# Patient Record
Sex: MALE | Race: WHITE | HISPANIC OR LATINO | Employment: FULL TIME | URBAN - METROPOLITAN AREA
[De-identification: names, ages, dates, MRNs, and addresses within clinical notes are randomized per-mention and may not be internally consistent; named-entity substitution may affect disease eponyms.]

---

## 2018-11-21 ENCOUNTER — HOSPITAL ENCOUNTER (OUTPATIENT)
Dept: RADIOLOGY | Facility: MEDICAL CENTER | Age: 43
End: 2018-11-21
Attending: FAMILY MEDICINE
Payer: COMMERCIAL

## 2018-11-21 ENCOUNTER — OFFICE VISIT (OUTPATIENT)
Dept: URGENT CARE | Facility: PHYSICIAN GROUP | Age: 43
End: 2018-11-21
Payer: COMMERCIAL

## 2018-11-21 VITALS
TEMPERATURE: 98.3 F | WEIGHT: 156 LBS | OXYGEN SATURATION: 99 % | HEART RATE: 79 BPM | SYSTOLIC BLOOD PRESSURE: 130 MMHG | RESPIRATION RATE: 18 BRPM | DIASTOLIC BLOOD PRESSURE: 88 MMHG

## 2018-11-21 DIAGNOSIS — M47.816 SPONDYLOSIS OF LUMBAR SPINE: ICD-10-CM

## 2018-11-21 DIAGNOSIS — R20.2 LEFT LEG PARESTHESIAS: ICD-10-CM

## 2018-11-21 DIAGNOSIS — M51.37 DEGENERATIVE DISC DISEASE AT L5-S1 LEVEL: ICD-10-CM

## 2018-11-21 PROCEDURE — 99203 OFFICE O/P NEW LOW 30 MIN: CPT | Performed by: FAMILY MEDICINE

## 2018-11-21 PROCEDURE — 72110 X-RAY EXAM L-2 SPINE 4/>VWS: CPT

## 2018-11-21 RX ORDER — PREDNISONE 10 MG/1
TABLET ORAL
Qty: 30 TAB | Refills: 0 | Status: SHIPPED | OUTPATIENT
Start: 2018-11-21 | End: 2018-12-03

## 2018-11-21 ASSESSMENT — ENCOUNTER SYMPTOMS
MYALGIAS: 0
COUGH: 0
FATIGUE: 0
NAUSEA: 0
ABDOMINAL PAIN: 0
SENSORY CHANGE: 1
VERTIGO: 0
BACK PAIN: 0
SWOLLEN GLANDS: 0
NUMBNESS: 1
FEVER: 0
JOINT SWELLING: 0
ANOREXIA: 0
EXTREMITY NUMBNESS: 1
CHILLS: 0
HEADACHES: 0
ARTHRALGIAS: 0
VISUAL CHANGE: 0
SORE THROAT: 0
FALLS: 0
FOCAL WEAKNESS: 0
NECK PAIN: 0
CHANGE IN BOWEL HABIT: 0
DIAPHORESIS: 0
VOMITING: 0
SEIZURES: 0
WEAKNESS: 0
LOSS OF CONSCIOUSNESS: 0

## 2018-11-21 NOTE — PATIENT INSTRUCTIONS
Oral steroid as directed  Avoid strenuous activities  Work restriction for at least a week  Please establish care next door so he can get to see somebody hopefully within the next 1-2 weeks  We will refer you to Orthopedics  If you have any worsening numbness but especially new onset of muscle weakness please be seen as soon as possible      For more information see the handout below    Degenerative Disk Disease  Introduction  Degenerative disk disease is a condition caused by the changes that occur in spinal disks as you grow older. Spinal disks are soft and compressible disks located between the bones of your spine (vertebrae). These disks act like shock absorbers. Degenerative disk disease can affect the whole spine. However, the neck and lower back are most commonly affected. Many changes can occur in the spinal disks with aging, such as:  · The spinal disks may dry and shrink.  · Small tears may occur in the tough, outer covering of the disk (annulus).  · The disk space may become smaller due to loss of water.  · Abnormal growths in the bone (spurs) may occur. This can put pressure on the nerve roots exiting the spinal canal, causing pain.  · The spinal canal may become narrowed.  What increases the risk?  · Being overweight.  · Having a family history of degenerative disk disease.  · Smoking.  · There is increased risk if you are doing heavy lifting or have a sudden injury.  What are the signs or symptoms?  Symptoms vary from person to person and may include:  · Pain that varies in intensity. Some people have no pain, while others have severe pain. The location of the pain depends on the part of your backbone that is affected.  ¨ You will have neck or arm pain if a disk in the neck area is affected.  ¨ You will have pain in your back, buttocks, or legs if a disk in the lower back is affected.  · Pain that becomes worse while bending, reaching up, or with twisting movements.  · Pain that may start gradually and  then get worse as time passes. It may also start after a major or minor injury.  · Numbness or tingling in the arms or legs.  How is this diagnosed?  Your health care provider will ask you about your symptoms and about activities or habits that may cause the pain. He or she may also ask about any injuries, diseases, or treatments you have had. Your health care provider will examine you to check for the range of movement that is possible in the affected area, to check for strength in your extremities, and to check for sensation in the areas of the arms and legs supplied by different nerve roots. You may also have:  · An X-ray of the spine.  · Other imaging tests, such as MRI.  How is this treated?  Your health care provider will advise you on the best plan for treatment. Treatment may include:  · Medicines.  · Rehabilitation exercises.  Follow these instructions at home:  · Follow proper lifting and walking techniques as advised by your health care provider.  · Maintain good posture.  · Exercise regularly as advised by your health care provider.  · Perform relaxation exercises.  · Change your sitting, standing, and sleeping habits as advised by your health care provider.  · Change positions frequently.  · Lose weight or maintain a healthy weight as advised by your health care provider.  · Do not use any tobacco products, including cigarettes, chewing tobacco, or electronic cigarettes. If you need help quitting, ask your health care provider.  · Wear supportive footwear.  · Take medicines only as directed by your health care provider.  Contact a health care provider if:  · Your pain does not go away within 1-4 weeks.  · You have significant appetite or weight loss.  Get help right away if:  · Your pain is severe.  · You notice weakness in your arms, hands, or legs.  · You begin to lose control of your bladder or bowel movements.  · You have fevers or night sweats.  This information is not intended to replace advice  given to you by your health care provider. Make sure you discuss any questions you have with your health care provider.  Document Released: 10/14/2008 Document Revised: 05/25/2017 Document Reviewed: 04/21/2015  © 2017 [employer]

## 2018-11-21 NOTE — PROGRESS NOTES
Subjective:      Niranjan Richter is a 43 y.o. male who presents with Foot Problem (x3 days, left heel, stepping on flat ground, can walk around ok, feels like it is moving up leg )            Numbness   Episode onset: past 3 days. The problem has been unchanged. Associated symptoms include numbness. Pertinent negatives include no abdominal pain, anorexia, arthralgias, change in bowel habit, chest pain, chills, congestion, coughing, diaphoresis, fatigue, fever, headaches, joint swelling, myalgias, nausea, neck pain, rash, sore throat, swollen glands, urinary symptoms, vertigo, visual change, vomiting or weakness. Nothing aggravates the symptoms. He has tried nothing for the symptoms.       Review of Systems   Constitutional: Negative for chills, diaphoresis, fatigue and fever.   HENT: Negative for congestion and sore throat.    Respiratory: Negative for cough.    Cardiovascular: Negative for chest pain.   Gastrointestinal: Negative for abdominal pain, anorexia, change in bowel habit, nausea and vomiting.   Musculoskeletal: Positive for joint pain (has had some pain in the right LE but not on the left). Negative for arthralgias, back pain, falls, joint swelling, myalgias and neck pain.   Skin: Negative for rash.   Neurological: Positive for sensory change and numbness. Negative for vertigo, focal weakness, seizures, loss of consciousness, weakness and headaches.   Patient denies any history of family history of neurologic disease like multiple sclerosis.  Denies any previous back pain or problems.  Does not smoke.  He uses marijuana but no other drugs.  No alcohol use reported.       Objective:     /88   Pulse 79   Temp 36.8 °C (98.3 °F)   Resp 18   Wt 70.8 kg (156 lb)   SpO2 99%      Physical Exam   Constitutional: He is oriented to person, place, and time. He appears well-developed and well-nourished. No distress.   Cardiovascular: Normal rate.    Musculoskeletal:        Thoracic back: He exhibits normal  range of motion, no tenderness, no bony tenderness, no swelling, no edema, no deformity, no laceration, no pain, no spasm and normal pulse.        Lumbar back: Normal. He exhibits normal range of motion, no tenderness, no bony tenderness, no swelling, no edema, no deformity, no laceration, no pain, no spasm and normal pulse.   Neurological: He is alert and oriented to person, place, and time. He has normal strength. He displays normal reflexes. A sensory deficit (Paresthesia noted on the lateral aspect of the left lower extremity below the knee and also on the lateral aspect of the left foot in the S1 region) is present. He exhibits normal muscle tone.   Grossly benight otherwise   Skin: He is not diaphoretic.     accu is 101     xray shows DDD in the L5-S1 region, no other acute changes and spondylosis of the lumbar spine, most prominent in the L5-S1 region      ASSESSMENT:PLAN:  1. Spondylosis of lumbar spine  - REFERRAL TO ORTHOPEDICS    2. Degenerative disc disease at L5-S1 level  - REFERRAL TO ORTHOPEDICS    3. Left leg paresthesias  - POCT Glucose  - DX-LUMBAR SPINE-4+ VIEWS; Future  - predniSONE (DELTASONE) 10 MG Tab; Start 60mg daily x 5 days  Dispense: 30 Tab; Refill: 0  - REFERRAL TO ORTHOPEDICS    No other neurologic deficit  Recommend trial of steroid burst, and work restrictions  Advised to establish care next door and hopefully be seen within the latest couple weeks for general care  In regards to significant spondylosis in the lower lumbar spine and his paresthesia, would recommend also a formal consultation and referral to Ortho-Spine  Plan per orders and instructions  Warning signs reviewed  Advised to be seen if any worsening or new Sxs otherwise with Ortho and should establish PCP  Work/School Excuse given

## 2018-11-21 NOTE — LETTER
November 21, 2018         Patient: Niranjan Richter   YOB: 1975   Date of Visit: 11/21/2018           To Whom it May Concern:    Niranjan Richter was seen in my clinic on 11/21/2018.    Recommend work restrictions, no lifting more than 10 pounds, and avoid frequent bending until 11/30/2018    If you have any questions or concerns, please don't hesitate to call.        Sincerely,           Cierra Garcia M.D.  Electronically Signed

## 2018-11-28 ENCOUNTER — OFFICE VISIT (OUTPATIENT)
Dept: URGENT CARE | Facility: PHYSICIAN GROUP | Age: 43
End: 2018-11-28
Payer: COMMERCIAL

## 2018-11-28 VITALS
HEART RATE: 74 BPM | SYSTOLIC BLOOD PRESSURE: 126 MMHG | RESPIRATION RATE: 16 BRPM | TEMPERATURE: 98.6 F | WEIGHT: 163 LBS | DIASTOLIC BLOOD PRESSURE: 74 MMHG | BODY MASS INDEX: 22.82 KG/M2 | HEIGHT: 71 IN | OXYGEN SATURATION: 98 %

## 2018-11-28 DIAGNOSIS — M54.32 SCIATICA OF LEFT SIDE: ICD-10-CM

## 2018-11-28 PROCEDURE — 99213 OFFICE O/P EST LOW 20 MIN: CPT | Performed by: EMERGENCY MEDICINE

## 2018-11-28 ASSESSMENT — ENCOUNTER SYMPTOMS
ABDOMINAL PAIN: 0
VOMITING: 0
EYE REDNESS: 0
CHILLS: 0
NAUSEA: 0
BACK PAIN: 1
NERVOUS/ANXIOUS: 0
EYE DISCHARGE: 0
SENSORY CHANGE: 1
FEVER: 0

## 2018-11-28 NOTE — LETTER
November 28, 2018        [place] [address]        Dear Niranjan:    Please ask to be given light duty no grater than 203, no bending for the next week.    If you have any questions or concerns, please don't hesitate to call.        Sincerely,        Pastor Martinez M.D.    Electronically Signed

## 2018-11-28 NOTE — PROGRESS NOTES
"Subjective:      Niranjan Richter is a 43 y.o. male who presents with Foot Problem (numbness, continuing)            HPI  Patient is a 43-year-old male complaining of persistent left leg numbness needs a new work note.  He did not realize he had already been referred to orthopedic surgery and has not made contact with the office as of yet.    Patient denies any bowel or bladder symptoms    PMH:  has no past medical history on file.  MEDS:   Current Outpatient Prescriptions:   •  predniSONE (DELTASONE) 10 MG Tab, Start 60mg daily x 5 days, Disp: 30 Tab, Rfl: 0  ALLERGIES: No Known Allergies  SURGHX: No past surgical history on file.  SOCHX:  reports that he has never smoked. He has never used smokeless tobacco. He reports that he drinks about 1.8 oz of alcohol per week . He reports that he uses drugs, including Marijuana, about 2 times per week.  FH: family history is not on file.  Review of Systems   Constitutional: Negative for chills and fever.   Eyes: Negative for discharge and redness.   Cardiovascular: Negative for chest pain.   Gastrointestinal: Negative for abdominal pain, nausea and vomiting.   Genitourinary: Negative.    Musculoskeletal: Positive for back pain.        Patient has radiation to his left foot from his low back.   Skin: Negative for rash.   Neurological: Positive for sensory change.        Patient has no bowel or bladder incontinence.   Psychiatric/Behavioral: The patient is not nervous/anxious.           Objective:     /74   Pulse 74   Temp 37 °C (98.6 °F)   Resp 16   Ht 1.803 m (5' 11\")   Wt 73.9 kg (163 lb)   SpO2 98%   BMI 22.73 kg/m²      Physical Exam   Constitutional: He appears well-developed and well-nourished. No distress.   HENT:   Head: Normocephalic and atraumatic.   Right Ear: External ear normal.   Left Ear: External ear normal.   Eyes: Right eye exhibits no discharge.   Cardiovascular: Normal rate.    Pulmonary/Chest: Effort normal and breath sounds normal. "   Musculoskeletal: Normal range of motion.   Straight leg raise is 90 degrees bilaterally, reflexes are 1+ in the knees 1+ in the ankles.  Dorsiflexion is 5+/5.  He has no current radiculopathy on the left radiating into his left foot.    Patient has no rash on his lumbar region.   Neurological: He is alert.   Skin: Skin is warm. No rash noted. He is not diaphoretic. No erythema.   Psychiatric: He has a normal mood and affect. His behavior is normal.   Nursing note and vitals reviewed.              Assessment/Plan:     Diagnosis sciatica on the left.    Patient has not been contacted by orthopedic surgery has yet but does have a physician in the chart that would will be contacting him.  Patient will contact [phone] and check on where his appointment is in the process of contacting him with the orthopedic surgeon.    Patient will return for any bowel or bladder incontinence on emergency basis.  He has been given a work note for another week off until he can find himself up with orthopedic surgery.

## 2018-11-30 ENCOUNTER — TELEPHONE (OUTPATIENT)
Dept: SCHEDULING | Facility: IMAGING CENTER | Age: 43
End: 2018-11-30

## 2018-12-03 ENCOUNTER — OFFICE VISIT (OUTPATIENT)
Dept: INTERNAL MEDICINE | Facility: MEDICAL CENTER | Age: 43
End: 2018-12-03
Payer: COMMERCIAL

## 2018-12-03 VITALS
OXYGEN SATURATION: 97 % | HEART RATE: 69 BPM | WEIGHT: 160 LBS | SYSTOLIC BLOOD PRESSURE: 134 MMHG | BODY MASS INDEX: 22.9 KG/M2 | HEIGHT: 70 IN | TEMPERATURE: 98.5 F | DIASTOLIC BLOOD PRESSURE: 90 MMHG

## 2018-12-03 DIAGNOSIS — R20.0 NUMBNESS AND TINGLING OF LEFT LEG: ICD-10-CM

## 2018-12-03 DIAGNOSIS — R20.2 NUMBNESS AND TINGLING OF LEFT LEG: ICD-10-CM

## 2018-12-03 DIAGNOSIS — Z76.89 ENCOUNTER TO ESTABLISH CARE: ICD-10-CM

## 2018-12-03 DIAGNOSIS — Z82.3 FAMILY HISTORY OF STROKE: ICD-10-CM

## 2018-12-03 PROCEDURE — 99204 OFFICE O/P NEW MOD 45 MIN: CPT | Mod: GC | Performed by: INTERNAL MEDICINE

## 2018-12-03 ASSESSMENT — PATIENT HEALTH QUESTIONNAIRE - PHQ9: CLINICAL INTERPRETATION OF PHQ2 SCORE: 0

## 2018-12-03 NOTE — PROGRESS NOTES
New Patient to Establish    Reason to establish: New patient to establish    CC: Numbness on the left leg    HPI: Niranjan Richter is a 43 y.o. male with no significnat past medical condition presented with numbness on the left leg that started on the Thanksgiving time and continue to today's date, patient denies any leg pain, back pain, motor weakness, bowel or urinary incontinences. Denies any pressure might place on the leg from crossing legs. 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 No acute fracture. Patient otherwise feel fine, denies any heavy alcohol intake, history of trauma, diabetes, syphilis or weight changes.    Review of Systems:     Constitutional: Denies fevers, Denies weight changes  Eyes: Denies changes in vision, no eye pain  Ears/Nose/Throat/Mouth: Denies nasal congestion or sore throat   Cardiovascular: Denies chest pain or palpitations   Respiratory: Denies shortness of breath , Denies cough  Gastrointestinal/Hepatic: Denies abdominal pain, nausea, vomiting, diarrhea or constipation.  Genitourinary: Denies bladder dysfunction, dysuria or frequency  Musculoskeletal/Rheum: Previous history of right foot trauma with operative correction.  Skin: Denies rash.  Neurological: Denies headache, confusion, memory loss or focal weakness, denies saddle anaesthesia, bowel or urinary incontinence    Psychiatric: denies mood disorder       Patient Active Problem List    Diagnosis Date Noted   • Numbness and tingling of left leg 12/03/2018   • Family history of stroke 12/03/2018       History reviewed. No pertinent past medical history.    No current outpatient prescriptions on file.     No current facility-administered medications for this visit.        Allergies as  "of 12/03/2018   • (No Known Allergies)       Social History     Social History   • Marital status: Single     Spouse name: N/A   • Number of children: N/A   • Years of education: N/A     Occupational History   • Not on file.     Social History Main Topics   • Smoking status: Never Smoker   • Smokeless tobacco: Never Used   • Alcohol use 1.8 oz/week     2 Cans of beer, 1 Shots of liquor per week      Comment: Social    • Drug use: Yes     Frequency: 2.0 times per week     Types: Marijuana      Comment: occasionally   • Sexual activity: Not on file     Other Topics Concern   • Not on file     Social History Narrative   • No narrative on file       Family History   Problem Relation Age of Onset   • Stroke Mother    • No Known Problems Father        Past Surgical History:   Procedure Laterality Date   • FOOT SURGERY           /90 (BP Location: Right arm, Patient Position: Sitting, BP Cuff Size: Adult)   Pulse 69   Temp 36.9 °C (98.5 °F) (Temporal)   Ht 1.778 m (5' 10\")   Wt 72.6 kg (160 lb)   SpO2 97%   BMI 22.96 kg/m²     Physical Exam  General:  Alert and oriented, No apparent distress.  Eyes: Pupils equal and reactive. No scleral icterus.  Throat: Clear no erythema or exudates noted.  Neck: Supple. No lymphadenopathy noted. Thyroid not enlarged.  Lungs: Clear to auscultation bilaterally. No wheezes, rhonchi or crackles.  Cardiovascular: Regular rate and rhythm. No murmurs, rubs or gallops.  Abdomen:  Soft, non tender, non distended. Bowel sounds positive.  Extremities: scar on the right dorsal aspect of the foot, loss on hair symmetrical on both legs, diminish sensation to light touch and sharps on the left lower extremity along the S1 dermatome mainly  No clubbing, cyanosis, edema. Equivocal planter refluxes   Skin: Clear. No rash or suspicious skin lesions noted.    Assessment and Plan  1. Numbness and tingling of left leg  - Sensory only the S1 distribution, no pain or motor symptoms, no saddle " anaesthesia, urine or bowel incontinence.  - No significant changes on lumbar x-ray.   - Adviced the patient ok to go to work for now from our aspects, he will confirm this with referral the PT.  - Already has an appointment with the orthopedics for evaluation/ will leave the option of MRI to them if indicated   - Ordered a nerve conduction study   - CBC WITH DIFFERENTIAL; Future  - COMP METABOLIC PANEL; Future  - VITAMIN B12; Future  - REFERRAL TO NEURODIAGNOSTICS (EEG,EP,EMG/NCS/DBS) Modality Requested: NCS-Comment Extremities  - REFERRAL TO PHYSICAL THERAPY Reason for Therapy: Eval/Treat/Report    2. Encounter to Establish Care  - CBC WITH DIFFERENTIAL; Future  - COMP METABOLIC PANEL; Future    3. Family History of Stroke  - mother in the past/ not sure of age  - CBC WITH DIFFERENTIAL; Future  - Lipid Profile; Future      4. Preventive care  - Flu - refused  - Pneumococcal - not indicated    .      Signed by: Kiley Foreman M.D.

## 2018-12-07 ENCOUNTER — OFFICE VISIT (OUTPATIENT)
Dept: URGENT CARE | Facility: PHYSICIAN GROUP | Age: 43
End: 2018-12-07
Payer: COMMERCIAL

## 2018-12-07 ENCOUNTER — HOSPITAL ENCOUNTER (OUTPATIENT)
Dept: LAB | Facility: MEDICAL CENTER | Age: 43
End: 2018-12-07
Attending: INTERNAL MEDICINE
Payer: COMMERCIAL

## 2018-12-07 VITALS
TEMPERATURE: 98 F | OXYGEN SATURATION: 99 % | WEIGHT: 160 LBS | SYSTOLIC BLOOD PRESSURE: 128 MMHG | DIASTOLIC BLOOD PRESSURE: 80 MMHG | BODY MASS INDEX: 22.9 KG/M2 | RESPIRATION RATE: 16 BRPM | HEIGHT: 70 IN | HEART RATE: 68 BPM

## 2018-12-07 DIAGNOSIS — R20.0 NUMBNESS AND TINGLING OF LEFT LEG: ICD-10-CM

## 2018-12-07 DIAGNOSIS — R20.2 NUMBNESS AND TINGLING OF LEFT LEG: ICD-10-CM

## 2018-12-07 DIAGNOSIS — Z76.89 ENCOUNTER TO ESTABLISH CARE: ICD-10-CM

## 2018-12-07 DIAGNOSIS — Z82.3 FAMILY HISTORY OF STROKE: ICD-10-CM

## 2018-12-07 DIAGNOSIS — Z86.69 HX OF SCIATICA: ICD-10-CM

## 2018-12-07 LAB
ALBUMIN SERPL BCP-MCNC: 4.3 G/DL (ref 3.2–4.9)
ALBUMIN/GLOB SERPL: 1.5 G/DL
ALP SERPL-CCNC: 66 U/L (ref 30–99)
ALT SERPL-CCNC: 19 U/L (ref 2–50)
ANION GAP SERPL CALC-SCNC: 7 MMOL/L (ref 0–11.9)
AST SERPL-CCNC: 18 U/L (ref 12–45)
BASOPHILS # BLD AUTO: 0.4 % (ref 0–1.8)
BASOPHILS # BLD: 0.03 K/UL (ref 0–0.12)
BILIRUB SERPL-MCNC: 1 MG/DL (ref 0.1–1.5)
BUN SERPL-MCNC: 15 MG/DL (ref 8–22)
CALCIUM SERPL-MCNC: 10.1 MG/DL (ref 8.5–10.5)
CHLORIDE SERPL-SCNC: 104 MMOL/L (ref 96–112)
CHOLEST SERPL-MCNC: 171 MG/DL (ref 100–199)
CO2 SERPL-SCNC: 28 MMOL/L (ref 20–33)
CREAT SERPL-MCNC: 0.87 MG/DL (ref 0.5–1.4)
EOSINOPHIL # BLD AUTO: 0.07 K/UL (ref 0–0.51)
EOSINOPHIL NFR BLD: 1 % (ref 0–6.9)
ERYTHROCYTE [DISTWIDTH] IN BLOOD BY AUTOMATED COUNT: 46.4 FL (ref 35.9–50)
FASTING STATUS PATIENT QL REPORTED: NORMAL
GLOBULIN SER CALC-MCNC: 2.8 G/DL (ref 1.9–3.5)
GLUCOSE SERPL-MCNC: 77 MG/DL (ref 65–99)
HCT VFR BLD AUTO: 43.4 % (ref 42–52)
HDLC SERPL-MCNC: 87 MG/DL
HGB BLD-MCNC: 15 G/DL (ref 14–18)
IMM GRANULOCYTES # BLD AUTO: 0.01 K/UL (ref 0–0.11)
IMM GRANULOCYTES NFR BLD AUTO: 0.1 % (ref 0–0.9)
LDLC SERPL CALC-MCNC: 70 MG/DL
LYMPHOCYTES # BLD AUTO: 2.34 K/UL (ref 1–4.8)
LYMPHOCYTES NFR BLD: 34.6 % (ref 22–41)
MCH RBC QN AUTO: 34.8 PG (ref 27–33)
MCHC RBC AUTO-ENTMCNC: 34.6 G/DL (ref 33.7–35.3)
MCV RBC AUTO: 100.7 FL (ref 81.4–97.8)
MONOCYTES # BLD AUTO: 0.34 K/UL (ref 0–0.85)
MONOCYTES NFR BLD AUTO: 5 % (ref 0–13.4)
NEUTROPHILS # BLD AUTO: 3.98 K/UL (ref 1.82–7.42)
NEUTROPHILS NFR BLD: 58.9 % (ref 44–72)
NRBC # BLD AUTO: 0 K/UL
NRBC BLD-RTO: 0 /100 WBC
PLATELET # BLD AUTO: 249 K/UL (ref 164–446)
PMV BLD AUTO: 9.3 FL (ref 9–12.9)
POTASSIUM SERPL-SCNC: 4.1 MMOL/L (ref 3.6–5.5)
PROT SERPL-MCNC: 7.1 G/DL (ref 6–8.2)
RBC # BLD AUTO: 4.31 M/UL (ref 4.7–6.1)
SODIUM SERPL-SCNC: 139 MMOL/L (ref 135–145)
TRIGL SERPL-MCNC: 70 MG/DL (ref 0–149)
VIT B12 SERPL-MCNC: 317 PG/ML (ref 211–911)
WBC # BLD AUTO: 6.8 K/UL (ref 4.8–10.8)

## 2018-12-07 PROCEDURE — 82607 VITAMIN B-12: CPT

## 2018-12-07 PROCEDURE — 99212 OFFICE O/P EST SF 10 MIN: CPT | Performed by: EMERGENCY MEDICINE

## 2018-12-07 PROCEDURE — 80061 LIPID PANEL: CPT

## 2018-12-07 PROCEDURE — 80053 COMPREHEN METABOLIC PANEL: CPT

## 2018-12-07 PROCEDURE — 85025 COMPLETE CBC W/AUTO DIFF WBC: CPT

## 2018-12-07 PROCEDURE — 36415 COLL VENOUS BLD VENIPUNCTURE: CPT

## 2018-12-07 NOTE — LETTER
December 7, 2018        [place] [address]        Dear Niranjan:    This letter is to verify that you may return to work full duty as of now.    If you have any questions or concerns, please don't hesitate to call.        Sincerely,        Pastor Martinez M.D.    Electronically Signed

## 2018-12-08 ASSESSMENT — ENCOUNTER SYMPTOMS
BACK PAIN: 0
EYE REDNESS: 0
EYE DISCHARGE: 0
NECK PAIN: 0
FEVER: 0
VOMITING: 0
CHILLS: 0
NAUSEA: 0
NERVOUS/ANXIOUS: 0

## 2018-12-09 NOTE — PROGRESS NOTES
"Subjective:      Niranjan Richter is a 43 y.o. male who presents with Letter for School/Work (needs letter returning to full duty at work)            HPI  43 yr old male with recent back pain who was seeen in UC and then by PCP and now needs a note to return to work. Pt denies any further complaints   PMH:  has no past medical history on file.  MEDS: No current outpatient prescriptions on file.  ALLERGIES: No Known Allergies  SURGHX:   Past Surgical History:   Procedure Laterality Date   • FOOT SURGERY       SOCHX:  reports that he has never smoked. He has never used smokeless tobacco. He reports that he drinks about 1.8 oz of alcohol per week . He reports that he uses drugs, including Marijuana, about 2 times per week.  FH: Reviewed with patient, not pertinent to this visit.   Review of Systems   Constitutional: Negative for chills and fever.   HENT: Negative for ear pain.    Eyes: Negative for discharge and redness.   Cardiovascular: Negative for chest pain.   Gastrointestinal: Negative for nausea and vomiting.   Genitourinary: Negative.         No bowel or bladder incontinence   Musculoskeletal: Negative for back pain, joint pain and neck pain.   Skin: Negative for rash.   Psychiatric/Behavioral: The patient is not nervous/anxious.           Objective:     /80 (BP Location: Right arm, Patient Position: Sitting, BP Cuff Size: Small adult)   Pulse 68   Temp 36.7 °C (98 °F) (Temporal)   Resp 16   Ht 1.778 m (5' 10\")   Wt 72.6 kg (160 lb)   SpO2 99%   BMI 22.96 kg/m²      Physical Exam   Constitutional: He appears well-developed and well-nourished. No distress.   HENT:   Head: Normocephalic and atraumatic.   Right Ear: External ear normal.   Left Ear: External ear normal.   Cardiovascular: Normal rate.    Pulmonary/Chest: Effort normal.   Musculoskeletal: Normal range of motion. He exhibits no edema, tenderness or deformity.   SLR 90/90, Reflexs 2+ knee and ankles, dorsiflexion great toe 5/5 , No " radiculopathy no saddle anesthesia and normal recal tone no lumbar rash.   Nursing note and vitals reviewed.              Assessment/Plan:     DX Resolved low back strain with sciatica on the left     Pt given note to return to work fullduty.

## 2018-12-12 ENCOUNTER — PHYSICAL THERAPY (OUTPATIENT)
Dept: PHYSICAL THERAPY | Facility: REHABILITATION | Age: 43
End: 2018-12-12
Attending: INTERNAL MEDICINE
Payer: COMMERCIAL

## 2018-12-12 DIAGNOSIS — R20.2 NUMBNESS AND TINGLING OF LEFT LEG: ICD-10-CM

## 2018-12-12 DIAGNOSIS — R20.0 NUMBNESS AND TINGLING OF LEFT LEG: ICD-10-CM

## 2018-12-12 PROCEDURE — 97161 PT EVAL LOW COMPLEX 20 MIN: CPT

## 2018-12-12 PROCEDURE — 97110 THERAPEUTIC EXERCISES: CPT

## 2018-12-12 ASSESSMENT — ENCOUNTER SYMPTOMS
PAIN SCALE: 0
PAIN SCALE AT LOWEST: 0
PAIN SCALE AT HIGHEST: 0

## 2018-12-12 NOTE — OP THERAPY EVALUATION
Outpatient Physical Therapy  INITIAL EVALUATION    [facility] [place]  [address], [address]  [place] [address]  Phone:  [phone]  Fax:  [phone]    Date of Evaluation: 2018    Patient: Niranjan Richter  YOB: 1975  MRN: [id number]     Referring Provider: Kiley Foreman M.D.  [address] [place] [address]   Referring Diagnosis Numbness and tingling of left leg [R20.0, R20.2]     Time Calculation  Start time: 0150  Stop time: 0240 Time Calculation (min): 50 minutes     Physical Therapy Occurrence Codes    Date of onset of impairment:  18   Date physical therapy care plan established or reviewed:  18   Date physical therapy treatment started:  18          Chief Complaint: L foot numbness    Visit Diagnoses     ICD-10-CM   1. Numbness and tingling of left leg R20.0    R20.2         Subjective:   History of Present Illness:     Mechanism of injury:  Niranjan Richter is a 43 y.o. male that presents to therapy with Left leg numbness. He states that symptoms came on with sudden onset while walking. Symptoms started along the outside of the L foot while walking. Notes that he had pins and needles up along the side of the foot along the side of the calf. This feeling has disapated and now it is just numb along that side. Patient denies fevers/chills/weakness of lower extremities, bowel/bladder incontinence, saddle anesthesia.    Aggravating factors: none.   Releiving factors: none.    ADL limitations:none    Pain:     Current pain ratin    At best pain ratin    At worst pain ratin      No past medical history on file.  Past Surgical History:   Procedure Laterality Date   • FOOT SURGERY       Social History   Substance Use Topics   • Smoking status: Never Smoker   • Smokeless tobacco: Never Used   • Alcohol use 1.8 oz/week     2 Cans of beer, 1 Shots of liquor per week      Comment: Social      Family and Occupational  History     Social History   • Marital status: Single     Spouse name: N/A   • Number of children: N/A   • Years of education: N/A       Objective     Hip Screen   Hip range of motion within functional limits.  Hip strength within functional limits  Hip joint mobility within functional limits    Neurological Testing     Sensation     Knee   Left Knee   Absent: pin prick    Right Knee   Intact: pin prick     Comments   Left pin prick: along lateral ankle and foot.     Reflexes   Left   Patellar (L4): normal (2+)  Achilles (S1): normal (2+)  Ankle clonus reflex: negative  Babinski sign: negative    Right   Patellar (L4): normal (2+)  Achilles (S1): normal (2+)  Ankle clonus reflex: negative  Babinski sign: negative    Myotome testing   Lumbar (left)   All left lumbar myotomes within normal limits    Lumbar (right)   All right lumbar myotomes within normal limits    Dermatome testing   Lumbar (left)   L1: intact  L2: intact  L3: intact  L4: intact  L5: intact  S1: decreased  S2: intact    Lumbar (right)   All right lumbar dermatomes intact    Palpation     Additional Palpation Details  No point tenderness LE or lumbar spine    Active Range of Motion     Lumbar   All lumbar active range of motion within functional limits  Left Knee   Normal active range of motion    Right Knee   Normal active range of motion    Passive Range of Motion     Lumbar   All lumbar passive range of motion within functional limits  Left Knee   Normal passive range of motion    Right Knee   Normal passive range of motion    Joint Play     Additional Joint Play Details   and UPA non painful and no change for symptoms in LLE.     Strength:      Abdominals   Lower abdominals: Able to maintain neutral with functional movement    Back   Flexion: 5  Extension: 5    Tests       Lumbar spine (left)      Negative slump.   Lumbar spine (right)     Negative slump.     Left Pelvic Girdle/Sacrum   Negative: sacral rotation, sacral thrust and thigh  thrust.     Right Pelvic Girdle/Sacrum   Negative: sacral rotation, sacral thrust and thigh thrust.     Left Hip   Negative Gaenslen's, SI compression and SI distraction.   SLR: Negative.     Right Hip   Negative Gaenslen's, SI compression and SI distraction.   SLR: Negative.     Additional Tests Details  Fibular compression negative. Palpation along fibular nerve distribution negative for change in symptoms.         Therapeutic Exercises (CPT 93068):       Therapeutic Exercise Summary: HEP instruction/performance and development.    Fibular nerve tensioners and glides x 20 2-3x a day. Pt education on fibular nerve entrapment, importance of foot checks with absence of pain response along lateral foot.       Time-based treatments/modalities:  Therapeutic exercise minutes (CPT 75297): 10 minutes       Assessment, Response and Plan:   Assessment details:  Niranjan Richter is a 43 y.o. male with signs and symptoms possibly consistent with previous fibular nerve entrapment. testing of lumbar spine was overall negative and symptoms do not fit typical presentations of L5-S1 nerve root irritation.  Skilled therapy is not indicated at this time as pt is not functionally limited and symptoms are resolving. Pt given exercises to promote proper nerve health and instructed to f/u with his doctor if symptoms do not continue to improve.      Plan:   Therapy options:  No further treatment needed      Functional Limitation G-Codes and Severity Modifiers  PT Functional Assessment Tool Used: LEFS  PT Functional Assessment Score: 74/80     Referring provider co-signature:  I have reviewed this plan of care and my co-signature certifies the need for services.  Certification Dates:   From 12/12/18     To 12/12/18     Physician Signature: ________________________________ Date: ______________

## 2018-12-31 ENCOUNTER — APPOINTMENT (OUTPATIENT)
Dept: PHYSICAL THERAPY | Facility: REHABILITATION | Age: 43
End: 2018-12-31
Attending: INTERNAL MEDICINE
Payer: COMMERCIAL

## 2019-01-02 ENCOUNTER — APPOINTMENT (OUTPATIENT)
Dept: PHYSICAL THERAPY | Facility: REHABILITATION | Age: 44
End: 2019-01-02
Attending: INTERNAL MEDICINE
Payer: COMMERCIAL

## 2019-01-07 ENCOUNTER — APPOINTMENT (OUTPATIENT)
Dept: PHYSICAL THERAPY | Facility: REHABILITATION | Age: 44
End: 2019-01-07
Attending: INTERNAL MEDICINE
Payer: COMMERCIAL

## 2019-01-09 ENCOUNTER — APPOINTMENT (OUTPATIENT)
Dept: PHYSICAL THERAPY | Facility: REHABILITATION | Age: 44
End: 2019-01-09
Attending: INTERNAL MEDICINE
Payer: COMMERCIAL

## 2019-01-14 ENCOUNTER — APPOINTMENT (OUTPATIENT)
Dept: PHYSICAL THERAPY | Facility: REHABILITATION | Age: 44
End: 2019-01-14
Attending: INTERNAL MEDICINE
Payer: COMMERCIAL

## 2019-01-16 ENCOUNTER — APPOINTMENT (OUTPATIENT)
Dept: PHYSICAL THERAPY | Facility: REHABILITATION | Age: 44
End: 2019-01-16
Attending: INTERNAL MEDICINE
Payer: COMMERCIAL

## 2019-01-21 ENCOUNTER — APPOINTMENT (OUTPATIENT)
Dept: PHYSICAL THERAPY | Facility: REHABILITATION | Age: 44
End: 2019-01-21
Attending: INTERNAL MEDICINE
Payer: COMMERCIAL

## 2019-01-23 ENCOUNTER — APPOINTMENT (OUTPATIENT)
Dept: PHYSICAL THERAPY | Facility: REHABILITATION | Age: 44
End: 2019-01-23
Attending: INTERNAL MEDICINE
Payer: COMMERCIAL

## 2019-05-31 ENCOUNTER — APPOINTMENT (OUTPATIENT)
Dept: RADIOLOGY | Facility: MEDICAL CENTER | Age: 44
End: 2019-05-31
Attending: EMERGENCY MEDICINE
Payer: COMMERCIAL

## 2019-05-31 ENCOUNTER — HOSPITAL ENCOUNTER (EMERGENCY)
Facility: MEDICAL CENTER | Age: 44
End: 2019-05-31
Attending: EMERGENCY MEDICINE
Payer: COMMERCIAL

## 2019-05-31 VITALS
SYSTOLIC BLOOD PRESSURE: 115 MMHG | BODY MASS INDEX: 22.9 KG/M2 | DIASTOLIC BLOOD PRESSURE: 83 MMHG | TEMPERATURE: 98.8 F | HEIGHT: 70 IN | RESPIRATION RATE: 16 BRPM | HEART RATE: 50 BPM | OXYGEN SATURATION: 98 % | WEIGHT: 160 LBS

## 2019-05-31 DIAGNOSIS — T50.905A ADVERSE EFFECT OF DRUG, INITIAL ENCOUNTER: ICD-10-CM

## 2019-05-31 DIAGNOSIS — F41.9 ANXIETY: ICD-10-CM

## 2019-05-31 LAB
ALBUMIN SERPL BCP-MCNC: 4.5 G/DL (ref 3.2–4.9)
ALBUMIN/GLOB SERPL: 2 G/DL
ALP SERPL-CCNC: 73 U/L (ref 30–99)
ALT SERPL-CCNC: 17 U/L (ref 2–50)
ANION GAP SERPL CALC-SCNC: 8 MMOL/L (ref 0–11.9)
AST SERPL-CCNC: 17 U/L (ref 12–45)
BASOPHILS # BLD AUTO: 0.6 % (ref 0–1.8)
BASOPHILS # BLD: 0.03 K/UL (ref 0–0.12)
BILIRUB SERPL-MCNC: 0.4 MG/DL (ref 0.1–1.5)
BUN SERPL-MCNC: 20 MG/DL (ref 8–22)
CALCIUM SERPL-MCNC: 8.9 MG/DL (ref 8.5–10.5)
CHLORIDE SERPL-SCNC: 105 MMOL/L (ref 96–112)
CO2 SERPL-SCNC: 26 MMOL/L (ref 20–33)
CREAT SERPL-MCNC: 0.71 MG/DL (ref 0.5–1.4)
EKG IMPRESSION: NORMAL
EOSINOPHIL # BLD AUTO: 0.15 K/UL (ref 0–0.51)
EOSINOPHIL NFR BLD: 3 % (ref 0–6.9)
ERYTHROCYTE [DISTWIDTH] IN BLOOD BY AUTOMATED COUNT: 49.1 FL (ref 35.9–50)
GLOBULIN SER CALC-MCNC: 2.3 G/DL (ref 1.9–3.5)
GLUCOSE SERPL-MCNC: 103 MG/DL (ref 65–99)
HCT VFR BLD AUTO: 42.9 % (ref 42–52)
HGB BLD-MCNC: 14.4 G/DL (ref 14–18)
IMM GRANULOCYTES # BLD AUTO: 0.01 K/UL (ref 0–0.11)
IMM GRANULOCYTES NFR BLD AUTO: 0.2 % (ref 0–0.9)
LIPASE SERPL-CCNC: 25 U/L (ref 11–82)
LYMPHOCYTES # BLD AUTO: 2.13 K/UL (ref 1–4.8)
LYMPHOCYTES NFR BLD: 42.6 % (ref 22–41)
MCH RBC QN AUTO: 35 PG (ref 27–33)
MCHC RBC AUTO-ENTMCNC: 33.6 G/DL (ref 33.7–35.3)
MCV RBC AUTO: 104.4 FL (ref 81.4–97.8)
MONOCYTES # BLD AUTO: 0.39 K/UL (ref 0–0.85)
MONOCYTES NFR BLD AUTO: 7.8 % (ref 0–13.4)
NEUTROPHILS # BLD AUTO: 2.29 K/UL (ref 1.82–7.42)
NEUTROPHILS NFR BLD: 45.8 % (ref 44–72)
NRBC # BLD AUTO: 0 K/UL
NRBC BLD-RTO: 0 /100 WBC
PLATELET # BLD AUTO: 256 K/UL (ref 164–446)
PMV BLD AUTO: 8.8 FL (ref 9–12.9)
POTASSIUM SERPL-SCNC: 4.2 MMOL/L (ref 3.6–5.5)
PROT SERPL-MCNC: 6.8 G/DL (ref 6–8.2)
RBC # BLD AUTO: 4.11 M/UL (ref 4.7–6.1)
SODIUM SERPL-SCNC: 139 MMOL/L (ref 135–145)
TROPONIN I SERPL-MCNC: <0.01 NG/ML (ref 0–0.04)
WBC # BLD AUTO: 5 K/UL (ref 4.8–10.8)

## 2019-05-31 PROCEDURE — 80053 COMPREHEN METABOLIC PANEL: CPT

## 2019-05-31 PROCEDURE — 84484 ASSAY OF TROPONIN QUANT: CPT

## 2019-05-31 PROCEDURE — 36415 COLL VENOUS BLD VENIPUNCTURE: CPT

## 2019-05-31 PROCEDURE — 93005 ELECTROCARDIOGRAM TRACING: CPT | Performed by: EMERGENCY MEDICINE

## 2019-05-31 PROCEDURE — 99285 EMERGENCY DEPT VISIT HI MDM: CPT

## 2019-05-31 PROCEDURE — A9270 NON-COVERED ITEM OR SERVICE: HCPCS | Performed by: EMERGENCY MEDICINE

## 2019-05-31 PROCEDURE — 700111 HCHG RX REV CODE 636 W/ 250 OVERRIDE (IP)

## 2019-05-31 PROCEDURE — 71045 X-RAY EXAM CHEST 1 VIEW: CPT

## 2019-05-31 PROCEDURE — 85025 COMPLETE CBC W/AUTO DIFF WBC: CPT

## 2019-05-31 PROCEDURE — 83690 ASSAY OF LIPASE: CPT

## 2019-05-31 PROCEDURE — 70551 MRI BRAIN STEM W/O DYE: CPT

## 2019-05-31 PROCEDURE — 700102 HCHG RX REV CODE 250 W/ 637 OVERRIDE(OP): Performed by: EMERGENCY MEDICINE

## 2019-05-31 PROCEDURE — 96374 THER/PROPH/DIAG INJ IV PUSH: CPT

## 2019-05-31 RX ORDER — LORAZEPAM 1 MG/1
1 TABLET ORAL ONCE
Status: COMPLETED | OUTPATIENT
Start: 2019-05-31 | End: 2019-05-31

## 2019-05-31 RX ORDER — LORAZEPAM 2 MG/ML
1 INJECTION INTRAMUSCULAR ONCE
Status: COMPLETED | OUTPATIENT
Start: 2019-05-31 | End: 2019-05-31

## 2019-05-31 RX ORDER — LORAZEPAM 2 MG/ML
INJECTION INTRAMUSCULAR
Status: COMPLETED
Start: 2019-05-31 | End: 2019-05-31

## 2019-05-31 RX ORDER — GABAPENTIN 100 MG/1
200 CAPSULE ORAL 3 TIMES DAILY
Status: ON HOLD | COMMUNITY
End: 2020-02-06

## 2019-05-31 RX ORDER — DIAZEPAM 5 MG/1
5 TABLET ORAL EVERY 8 HOURS PRN
Qty: 12 TAB | Refills: 0 | Status: SHIPPED | OUTPATIENT
Start: 2019-05-31 | End: 2019-06-04

## 2019-05-31 RX ORDER — MELOXICAM 15 MG/1
15 TABLET ORAL DAILY
Status: ON HOLD | COMMUNITY
End: 2020-10-22

## 2019-05-31 RX ORDER — BACLOFEN 10 MG/1
10 TABLET ORAL 3 TIMES DAILY
Qty: 60 TAB | Refills: 0 | Status: ON HOLD | OUTPATIENT
Start: 2019-05-31 | End: 2020-02-06

## 2019-05-31 RX ADMIN — LORAZEPAM 1 MG: 2 INJECTION INTRAMUSCULAR; INTRAVENOUS at 06:33

## 2019-05-31 RX ADMIN — LORAZEPAM 1 MG: 1 TABLET ORAL at 08:59

## 2019-05-31 RX ADMIN — LORAZEPAM 1 MG: 2 INJECTION INTRAMUSCULAR at 06:33

## 2019-05-31 ASSESSMENT — LIFESTYLE VARIABLES: DO YOU DRINK ALCOHOL: NO

## 2019-05-31 NOTE — ED NOTES
Med rec complete per S/O and [facility]  Allergies reviewed  No PO antibiotics in last 30 days

## 2019-05-31 NOTE — ED NOTES
Discharge instructions given.  All questions answered.  Prescriptions given x2.  Pt to follow-up with PCP and neuro, return to ER if symptoms worsen as discussed.  Pt verbalized understanding.  All belongings with pt.  Pt escorted to lobby.

## 2019-05-31 NOTE — ED NOTES
BREAK RN:  Ativan PO ordered by ERP. Dysphagia screening completed d/t pt's c/o weakness. Dysphagia screening PASS. Pt medicated per MAR.

## 2019-05-31 NOTE — ED NOTES
"Chief Complaint   Patient presents with   • Weakness     generalized weakness that started at work, got dizzy and started shaking       Pt brought immediately to R10 from triage, ERP at bedside, pt in gown and on monitor. Orders received.       /83   Pulse 62   Temp 37.1 °C (98.8 °F) (Temporal)   Resp 18   Ht 1.778 m (5' 10\")   Wt 72.6 kg (160 lb)   SpO2 100%   BMI 22.96 kg/m²   "

## 2019-05-31 NOTE — ED PROVIDER NOTES
ED Provider Note    CHIEF COMPLAINT  Chief Complaint   Patient presents with   • Weakness     generalized weakness that started at work, got dizzy and started shaking       HPI  Niranjan Richter is a 43 y.o. male who presents to emerge from today with complaints of generalized weakness, shaking to his arms increased on the right, difficulty speaking which started 1 hour ago while at work.  Patient awoke this morning normal.  He has had a history of increasing panic attacks over this past weekend.  He states that he has history of disc disease over lumbar spine with pain and numbness to his left leg.  He was seen by orthopedics and placed on gabapentin and increase the dose to 1800 mg a day and started noticing shakes and not feeling good.  Denies chest pain or shortness of breath no fever chills.  NIH scale of 1 due to stuttering speech.  Discussed case with neurologist both feel he was not candidate for TPA as his symptoms resolved with treatment of Ativan and has history of anxiety/panic attacks that this may be result of his medications recently as described above.    REVIEW OF SYSTEMS  See HPI for further details. All other systems are negative.     PAST MEDICAL HISTORY  No past medical history on file.    FAMILY HISTORY  [unfilled]    SOCIAL HISTORY  Social History     Social History   • Marital status: Single     Spouse name: N/A   • Number of children: N/A   • Years of education: N/A     Social History Main Topics   • Smoking status: Never Smoker   • Smokeless tobacco: Never Used   • Alcohol use 1.8 oz/week     2 Cans of beer, 1 Shots of liquor per week      Comment: Social    • Drug use: Yes     Frequency: 2.0 times per week     Types: Marijuana      Comment: occasionally   • Sexual activity: Not on file     Other Topics Concern   • Not on file     Social History Narrative   • No narrative on file       SURGICAL HISTORY  Past Surgical History:   Procedure Laterality Date   • FOOT SURGERY         CURRENT  "MEDICATIONS  Home Medications     Reviewed by Izzy Meier (Pharmacy Tech) on 05/31/19 at 0953  Med List Status: Complete   Medication Last Dose Status   gabapentin (NEURONTIN) 100 MG Cap 5/30/2019 Active   meloxicam (MOBIC) 15 MG tablet 5/30/2019 Active                ALLERGIES  No Known Allergies    PHYSICAL EXAM  VITAL SIGNS: /83   Pulse (!) 50   Temp 37.1 °C (98.8 °F) (Temporal)   Resp 16   Ht 1.778 m (5' 10\")   Wt 72.6 kg (160 lb)   SpO2 98%   BMI 22.96 kg/m²  Room air O2: 100    Constitutional: Well developed, Well nourished,  acute distress, Non-toxic appearance.   HENT: Normocephalic, Atraumatic, Bilateral external ears normal, Oropharynx moist, No oral exudates, Nose normal.   Eyes:  Conjunctiva normal, No discharge.   Neck: Normal range of motion, No tenderness, Supple, No stridor.   Lymphatic: No lymphadenopathy noted.   Cardiovascular: Normal heart rate, Normal rhythm, No murmurs, No rubs, No gallops.   Thorax & Lungs: Normal breath sounds, No respiratory distress, No wheezing, No chest tenderness.   Abdomen: Bowel sounds normal, Soft, No tenderness, No masses, No pulsatile masses.   Skin: Warm, Dry, No erythema, No rash.   Back: No tenderness, No CVA tenderness.   Extremities: Intact distal pulses, No edema, No tenderness, No cyanosis, No clubbing.   Musculoskeletal: Good range of motion in all major joints. No tenderness to palpation or major deformities noted.  Patient initially unable to ambulate following treatment he is able to ambulate without difficulty.  Neurologic: Alert & oriented x 3, patient has tremors/shaking to both hands right greater than left there is no focal deficits he does have stuttering speech but no lateralizing signs noted.  Patient not considered a candidate for TPA as discussed above.  Psychiatric: Affect normal, Judgment normal, Mood anxious.     EKG  Normal sinus rhythm at 80 bpm, no ST elevation or depression, no widening of the QRS complex, good R " wave progression, IL intervals are normal, no diagnostic Q waves noted.  This is a 12-lead EKG there is no previous EKG available at this time for comparison.    RADIOLOGY/PROCEDURES  MR-BRAIN-W/O   Final Result      MRI of the brain without contrast within normal limits.      DX-CHEST-PORTABLE (1 VIEW)   Final Result      No acute cardiopulmonary abnormality.            COURSE & MEDICAL DECISION MAKING  Pertinent Labs & Imaging studies reviewed. (See chart for details)  Patient had difficulty speaking mostly of stuttering speech with generalized weakness he had no focal deficits or lateralizing signs.  Discussed however the case with neurologist Dr. Napier and he recommended MRI scan be performed this was negative patient has had a history of recent placed on gabapentin and symptoms seem to get worse when he increase his dose is had increasing anxiety attacks he was given Ativan here in the emergency room with resolution of his symptoms although it did start coming back and was given a second dose.  Discussed with Dr. Cross neurologist currently on-call were all in agreement this patient does not have any lateralizing signs or evidence of CVA this is most likely medication reaction with superimposed anxiety.  Discussed case with Dr. Osborn patient's orthopod and we will discontinue gabapentin he was placed on baclofen and limited amount of Valium for anxiety muscle relaxant will follow up with Dr. Osborn this coming week in his office return to persistent worsening symptoms on reexamination prior to discharge patient is asymptomatic except for sleepy from his medication feels much improved with patient his wife verbalized above instructions and need for follow-up.    FINAL IMPRESSION  1.  Acute adverse medication reaction  2.  Generalized weakness second #1  3.  Anxiety/panic attack  4.  History of herniated disc with radiculopathy         Electronically signed by: Herman Faith, 5/31/2019 1:34 PM

## 2020-01-02 ENCOUNTER — HOSPITAL ENCOUNTER (OUTPATIENT)
Dept: RADIOLOGY | Facility: MEDICAL CENTER | Age: 45
End: 2020-01-02
Attending: NEUROLOGICAL SURGERY
Payer: COMMERCIAL

## 2020-01-02 DIAGNOSIS — M54.50 LOW BACK PAIN, UNSPECIFIED BACK PAIN LATERALITY, UNSPECIFIED CHRONICITY, UNSPECIFIED WHETHER SCIATICA PRESENT: ICD-10-CM

## 2020-01-02 PROCEDURE — 72110 X-RAY EXAM L-2 SPINE 4/>VWS: CPT

## 2020-01-29 ENCOUNTER — HOSPITAL ENCOUNTER (OUTPATIENT)
Dept: RADIOLOGY | Facility: MEDICAL CENTER | Age: 45
End: 2020-01-29
Attending: SURGERY
Payer: COMMERCIAL

## 2020-01-29 DIAGNOSIS — G54.0 TOS (THORACIC OUTLET SYNDROME): ICD-10-CM

## 2020-01-29 PROCEDURE — 71046 X-RAY EXAM CHEST 2 VIEWS: CPT

## 2020-02-06 ENCOUNTER — APPOINTMENT (OUTPATIENT)
Dept: RADIOLOGY | Facility: MEDICAL CENTER | Age: 45
DRG: 030 | End: 2020-02-06
Attending: SURGERY
Payer: COMMERCIAL

## 2020-02-06 ENCOUNTER — ANESTHESIA (OUTPATIENT)
Dept: SURGERY | Facility: MEDICAL CENTER | Age: 45
DRG: 030 | End: 2020-02-06
Payer: COMMERCIAL

## 2020-02-06 ENCOUNTER — ANESTHESIA EVENT (OUTPATIENT)
Dept: SURGERY | Facility: MEDICAL CENTER | Age: 45
DRG: 030 | End: 2020-02-06
Payer: COMMERCIAL

## 2020-02-06 ENCOUNTER — HOSPITAL ENCOUNTER (INPATIENT)
Facility: MEDICAL CENTER | Age: 45
LOS: 1 days | DRG: 030 | End: 2020-02-07
Attending: SURGERY | Admitting: SURGERY
Payer: COMMERCIAL

## 2020-02-06 PROCEDURE — 0PT10ZZ RESECTION OF 1 TO 2 RIBS, OPEN APPROACH: ICD-10-PCS | Performed by: SURGERY

## 2020-02-06 PROCEDURE — 700111 HCHG RX REV CODE 636 W/ 250 OVERRIDE (IP)

## 2020-02-06 PROCEDURE — 501838 HCHG SUTURE GENERAL: Performed by: SURGERY

## 2020-02-06 PROCEDURE — 700101 HCHG RX REV CODE 250: Performed by: STUDENT IN AN ORGANIZED HEALTH CARE EDUCATION/TRAINING PROGRAM

## 2020-02-06 PROCEDURE — A9270 NON-COVERED ITEM OR SERVICE: HCPCS | Performed by: ANESTHESIOLOGY

## 2020-02-06 PROCEDURE — 01N30ZZ RELEASE BRACHIAL PLEXUS, OPEN APPROACH: ICD-10-PCS | Performed by: SURGERY

## 2020-02-06 PROCEDURE — 501411 HCHG SPONGE, BABY LAP W/O RINGS: Performed by: SURGERY

## 2020-02-06 PROCEDURE — 770006 HCHG ROOM/CARE - MED/SURG/GYN SEMI*

## 2020-02-06 PROCEDURE — A9270 NON-COVERED ITEM OR SERVICE: HCPCS

## 2020-02-06 PROCEDURE — 700112 HCHG RX REV CODE 229: Performed by: SURGERY

## 2020-02-06 PROCEDURE — 160035 HCHG PACU - 1ST 60 MINS PHASE I: Performed by: SURGERY

## 2020-02-06 PROCEDURE — 700111 HCHG RX REV CODE 636 W/ 250 OVERRIDE (IP): Performed by: STUDENT IN AN ORGANIZED HEALTH CARE EDUCATION/TRAINING PROGRAM

## 2020-02-06 PROCEDURE — 160002 HCHG RECOVERY MINUTES (STAT): Performed by: SURGERY

## 2020-02-06 PROCEDURE — 700102 HCHG RX REV CODE 250 W/ 637 OVERRIDE(OP)

## 2020-02-06 PROCEDURE — 71045 X-RAY EXAM CHEST 1 VIEW: CPT

## 2020-02-06 PROCEDURE — 88300 SURGICAL PATH GROSS: CPT

## 2020-02-06 PROCEDURE — 501445 HCHG STAPLER, SKIN DISP: Performed by: SURGERY

## 2020-02-06 PROCEDURE — 160029 HCHG SURGERY MINUTES - 1ST 30 MINS LEVEL 4: Performed by: SURGERY

## 2020-02-06 PROCEDURE — 700105 HCHG RX REV CODE 258: Performed by: SURGERY

## 2020-02-06 PROCEDURE — 700101 HCHG RX REV CODE 250: Performed by: SURGERY

## 2020-02-06 PROCEDURE — A9270 NON-COVERED ITEM OR SERVICE: HCPCS | Performed by: SURGERY

## 2020-02-06 PROCEDURE — 160041 HCHG SURGERY MINUTES - EA ADDL 1 MIN LEVEL 4: Performed by: SURGERY

## 2020-02-06 PROCEDURE — 700102 HCHG RX REV CODE 250 W/ 637 OVERRIDE(OP): Performed by: SURGERY

## 2020-02-06 PROCEDURE — 160009 HCHG ANES TIME/MIN: Performed by: SURGERY

## 2020-02-06 PROCEDURE — 160048 HCHG OR STATISTICAL LEVEL 1-5: Performed by: SURGERY

## 2020-02-06 PROCEDURE — 700102 HCHG RX REV CODE 250 W/ 637 OVERRIDE(OP): Performed by: ANESTHESIOLOGY

## 2020-02-06 RX ORDER — SUCCINYLCHOLINE/SOD CL,ISO/PF 200MG/10ML
SYRINGE (ML) INTRAVENOUS PRN
Status: DISCONTINUED | OUTPATIENT
Start: 2020-02-06 | End: 2020-02-06 | Stop reason: SURG

## 2020-02-06 RX ORDER — HYDROMORPHONE HYDROCHLORIDE 1 MG/ML
0.2 INJECTION, SOLUTION INTRAMUSCULAR; INTRAVENOUS; SUBCUTANEOUS
Status: DISCONTINUED | OUTPATIENT
Start: 2020-02-06 | End: 2020-02-06 | Stop reason: HOSPADM

## 2020-02-06 RX ORDER — FAMOTIDINE 20 MG/1
20 TABLET, FILM COATED ORAL 2 TIMES DAILY
Status: DISCONTINUED | OUTPATIENT
Start: 2020-02-06 | End: 2020-02-07

## 2020-02-06 RX ORDER — DEXAMETHASONE SODIUM PHOSPHATE 4 MG/ML
INJECTION, SOLUTION INTRA-ARTICULAR; INTRALESIONAL; INTRAMUSCULAR; INTRAVENOUS; SOFT TISSUE PRN
Status: DISCONTINUED | OUTPATIENT
Start: 2020-02-06 | End: 2020-02-06 | Stop reason: SURG

## 2020-02-06 RX ORDER — CELECOXIB 200 MG/1
200 CAPSULE ORAL 2 TIMES DAILY
Status: DISCONTINUED | OUTPATIENT
Start: 2020-02-06 | End: 2020-02-07 | Stop reason: HOSPADM

## 2020-02-06 RX ORDER — BISACODYL 10 MG
10 SUPPOSITORY, RECTAL RECTAL
Status: DISCONTINUED | OUTPATIENT
Start: 2020-02-06 | End: 2020-02-07 | Stop reason: HOSPADM

## 2020-02-06 RX ORDER — ACETAMINOPHEN 500 MG
1000 TABLET ORAL ONCE
Status: COMPLETED | OUTPATIENT
Start: 2020-02-06 | End: 2020-02-06

## 2020-02-06 RX ORDER — AMOXICILLIN 250 MG
1 CAPSULE ORAL
Status: DISCONTINUED | OUTPATIENT
Start: 2020-02-06 | End: 2020-02-07 | Stop reason: HOSPADM

## 2020-02-06 RX ORDER — ACETAMINOPHEN 500 MG
1000 TABLET ORAL EVERY 6 HOURS
Status: DISCONTINUED | OUTPATIENT
Start: 2020-02-06 | End: 2020-02-07 | Stop reason: HOSPADM

## 2020-02-06 RX ORDER — AMOXICILLIN 250 MG
1 CAPSULE ORAL NIGHTLY
Status: DISCONTINUED | OUTPATIENT
Start: 2020-02-06 | End: 2020-02-07 | Stop reason: HOSPADM

## 2020-02-06 RX ORDER — BUPIVACAINE HYDROCHLORIDE AND EPINEPHRINE 2.5; 5 MG/ML; UG/ML
INJECTION, SOLUTION EPIDURAL; INFILTRATION; INTRACAUDAL; PERINEURAL
Status: DISCONTINUED | OUTPATIENT
Start: 2020-02-06 | End: 2020-02-06 | Stop reason: HOSPADM

## 2020-02-06 RX ORDER — LIDOCAINE HYDROCHLORIDE 10 MG/ML
INJECTION, SOLUTION EPIDURAL; INFILTRATION; INTRACAUDAL; PERINEURAL
Status: COMPLETED
Start: 2020-02-06 | End: 2020-02-06

## 2020-02-06 RX ORDER — HYDROMORPHONE HYDROCHLORIDE 2 MG/ML
INJECTION, SOLUTION INTRAMUSCULAR; INTRAVENOUS; SUBCUTANEOUS PRN
Status: DISCONTINUED | OUTPATIENT
Start: 2020-02-06 | End: 2020-02-06 | Stop reason: SURG

## 2020-02-06 RX ORDER — OXYCODONE HCL 5 MG/5 ML
10 SOLUTION, ORAL ORAL
Status: COMPLETED | OUTPATIENT
Start: 2020-02-06 | End: 2020-02-06

## 2020-02-06 RX ORDER — ROCURONIUM BROMIDE 10 MG/ML
INJECTION, SOLUTION INTRAVENOUS PRN
Status: DISCONTINUED | OUTPATIENT
Start: 2020-02-06 | End: 2020-02-06 | Stop reason: SURG

## 2020-02-06 RX ORDER — MEPERIDINE HYDROCHLORIDE 25 MG/ML
12.5 INJECTION INTRAMUSCULAR; INTRAVENOUS; SUBCUTANEOUS
Status: DISCONTINUED | OUTPATIENT
Start: 2020-02-06 | End: 2020-02-06 | Stop reason: HOSPADM

## 2020-02-06 RX ORDER — HYDROMORPHONE HYDROCHLORIDE 1 MG/ML
0.1 INJECTION, SOLUTION INTRAMUSCULAR; INTRAVENOUS; SUBCUTANEOUS
Status: DISCONTINUED | OUTPATIENT
Start: 2020-02-06 | End: 2020-02-06 | Stop reason: HOSPADM

## 2020-02-06 RX ORDER — DIPHENHYDRAMINE HYDROCHLORIDE 50 MG/ML
12.5 INJECTION INTRAMUSCULAR; INTRAVENOUS
Status: DISCONTINUED | OUTPATIENT
Start: 2020-02-06 | End: 2020-02-06 | Stop reason: HOSPADM

## 2020-02-06 RX ORDER — CELECOXIB 200 MG/1
400 CAPSULE ORAL ONCE
Status: COMPLETED | OUTPATIENT
Start: 2020-02-06 | End: 2020-02-06

## 2020-02-06 RX ORDER — HYDROMORPHONE HYDROCHLORIDE 1 MG/ML
0.4 INJECTION, SOLUTION INTRAMUSCULAR; INTRAVENOUS; SUBCUTANEOUS
Status: DISCONTINUED | OUTPATIENT
Start: 2020-02-06 | End: 2020-02-06 | Stop reason: HOSPADM

## 2020-02-06 RX ORDER — OXYCODONE HYDROCHLORIDE 5 MG/1
5 TABLET ORAL
Status: DISCONTINUED | OUTPATIENT
Start: 2020-02-06 | End: 2020-02-07

## 2020-02-06 RX ORDER — POLYETHYLENE GLYCOL 3350 17 G/17G
1 POWDER, FOR SOLUTION ORAL 2 TIMES DAILY
Status: DISCONTINUED | OUTPATIENT
Start: 2020-02-06 | End: 2020-02-07 | Stop reason: HOSPADM

## 2020-02-06 RX ORDER — HALOPERIDOL 5 MG/ML
1 INJECTION INTRAMUSCULAR
Status: DISCONTINUED | OUTPATIENT
Start: 2020-02-06 | End: 2020-02-06 | Stop reason: HOSPADM

## 2020-02-06 RX ORDER — ONDANSETRON 2 MG/ML
4 INJECTION INTRAMUSCULAR; INTRAVENOUS EVERY 4 HOURS PRN
Status: DISCONTINUED | OUTPATIENT
Start: 2020-02-06 | End: 2020-02-07 | Stop reason: HOSPADM

## 2020-02-06 RX ORDER — METHOCARBAMOL 750 MG/1
750 TABLET, FILM COATED ORAL 3 TIMES DAILY
Status: ON HOLD | COMMUNITY
End: 2020-10-22

## 2020-02-06 RX ORDER — MIDAZOLAM HYDROCHLORIDE 1 MG/ML
INJECTION INTRAMUSCULAR; INTRAVENOUS PRN
Status: DISCONTINUED | OUTPATIENT
Start: 2020-02-06 | End: 2020-02-06 | Stop reason: SURG

## 2020-02-06 RX ORDER — HYDROMORPHONE HYDROCHLORIDE 1 MG/ML
0.5 INJECTION, SOLUTION INTRAMUSCULAR; INTRAVENOUS; SUBCUTANEOUS
Status: DISCONTINUED | OUTPATIENT
Start: 2020-02-06 | End: 2020-02-07

## 2020-02-06 RX ORDER — CEFAZOLIN SODIUM 1 G/3ML
INJECTION, POWDER, FOR SOLUTION INTRAMUSCULAR; INTRAVENOUS PRN
Status: DISCONTINUED | OUTPATIENT
Start: 2020-02-06 | End: 2020-02-06 | Stop reason: SURG

## 2020-02-06 RX ORDER — DOCUSATE SODIUM 100 MG/1
100 CAPSULE, LIQUID FILLED ORAL 2 TIMES DAILY
Status: DISCONTINUED | OUTPATIENT
Start: 2020-02-06 | End: 2020-02-07 | Stop reason: HOSPADM

## 2020-02-06 RX ORDER — GABAPENTIN 300 MG/1
300 CAPSULE ORAL 3 TIMES DAILY
Status: DISCONTINUED | OUTPATIENT
Start: 2020-02-06 | End: 2020-02-07

## 2020-02-06 RX ORDER — OXYCODONE HYDROCHLORIDE 5 MG/1
10 TABLET ORAL
Status: DISCONTINUED | OUTPATIENT
Start: 2020-02-06 | End: 2020-02-07

## 2020-02-06 RX ORDER — LIDOCAINE HYDROCHLORIDE 20 MG/ML
INJECTION, SOLUTION EPIDURAL; INFILTRATION; INTRACAUDAL; PERINEURAL PRN
Status: DISCONTINUED | OUTPATIENT
Start: 2020-02-06 | End: 2020-02-06 | Stop reason: SURG

## 2020-02-06 RX ORDER — ENEMA 19; 7 G/133ML; G/133ML
1 ENEMA RECTAL
Status: DISCONTINUED | OUTPATIENT
Start: 2020-02-06 | End: 2020-02-07 | Stop reason: HOSPADM

## 2020-02-06 RX ORDER — SODIUM CHLORIDE, SODIUM LACTATE, POTASSIUM CHLORIDE, CALCIUM CHLORIDE 600; 310; 30; 20 MG/100ML; MG/100ML; MG/100ML; MG/100ML
INJECTION, SOLUTION INTRAVENOUS CONTINUOUS
Status: ACTIVE | OUTPATIENT
Start: 2020-02-06 | End: 2020-02-06

## 2020-02-06 RX ORDER — ONDANSETRON 2 MG/ML
INJECTION INTRAMUSCULAR; INTRAVENOUS PRN
Status: DISCONTINUED | OUTPATIENT
Start: 2020-02-06 | End: 2020-02-06 | Stop reason: SURG

## 2020-02-06 RX ORDER — OXYCODONE HCL 5 MG/5 ML
SOLUTION, ORAL ORAL
Status: COMPLETED
Start: 2020-02-06 | End: 2020-02-06

## 2020-02-06 RX ORDER — ONDANSETRON 2 MG/ML
4 INJECTION INTRAMUSCULAR; INTRAVENOUS
Status: DISCONTINUED | OUTPATIENT
Start: 2020-02-06 | End: 2020-02-06 | Stop reason: HOSPADM

## 2020-02-06 RX ORDER — SODIUM CHLORIDE, SODIUM LACTATE, POTASSIUM CHLORIDE, CALCIUM CHLORIDE 600; 310; 30; 20 MG/100ML; MG/100ML; MG/100ML; MG/100ML
INJECTION, SOLUTION INTRAVENOUS CONTINUOUS
Status: DISCONTINUED | OUTPATIENT
Start: 2020-02-06 | End: 2020-02-06 | Stop reason: HOSPADM

## 2020-02-06 RX ORDER — OXYCODONE HCL 5 MG/5 ML
5 SOLUTION, ORAL ORAL
Status: COMPLETED | OUTPATIENT
Start: 2020-02-06 | End: 2020-02-06

## 2020-02-06 RX ADMIN — ROCURONIUM BROMIDE 5 MG: 10 INJECTION, SOLUTION INTRAVENOUS at 13:44

## 2020-02-06 RX ADMIN — FENTANYL CITRATE 50 MCG: 50 INJECTION, SOLUTION INTRAMUSCULAR; INTRAVENOUS at 16:26

## 2020-02-06 RX ADMIN — FENTANYL CITRATE 25 MCG: 0.05 INJECTION, SOLUTION INTRAMUSCULAR; INTRAVENOUS at 16:55

## 2020-02-06 RX ADMIN — OXYCODONE HYDROCHLORIDE 5 MG: 5 SOLUTION ORAL at 17:03

## 2020-02-06 RX ADMIN — ACETAMINOPHEN 1000 MG: 500 TABLET ORAL at 11:07

## 2020-02-06 RX ADMIN — SENNOSIDES-DOCUSATE SODIUM TAB 8.6-50 MG 1 TABLET: 8.6-5 TAB at 20:45

## 2020-02-06 RX ADMIN — SODIUM CHLORIDE, POTASSIUM CHLORIDE, SODIUM LACTATE AND CALCIUM CHLORIDE: 600; 310; 30; 20 INJECTION, SOLUTION INTRAVENOUS at 11:05

## 2020-02-06 RX ADMIN — PROPOFOL 200 MG: 10 INJECTION, EMULSION INTRAVENOUS at 13:45

## 2020-02-06 RX ADMIN — LIDOCAINE HYDROCHLORIDE 0.5 ML: 10 INJECTION, SOLUTION EPIDURAL; INFILTRATION; INTRACAUDAL; PERINEURAL at 10:45

## 2020-02-06 RX ADMIN — ACETAMINOPHEN 1000 MG: 500 TABLET ORAL at 18:31

## 2020-02-06 RX ADMIN — HYDROMORPHONE HYDROCHLORIDE 0.5 MG: 2 INJECTION, SOLUTION INTRAMUSCULAR; INTRAVENOUS; SUBCUTANEOUS at 13:54

## 2020-02-06 RX ADMIN — Medication 5 MG: at 17:03

## 2020-02-06 RX ADMIN — FENTANYL CITRATE 25 MCG: 50 INJECTION, SOLUTION INTRAMUSCULAR; INTRAVENOUS at 17:00

## 2020-02-06 RX ADMIN — LIDOCAINE HYDROCHLORIDE 100 MG: 20 INJECTION, SOLUTION EPIDURAL; INFILTRATION; INTRACAUDAL at 13:45

## 2020-02-06 RX ADMIN — MIDAZOLAM HYDROCHLORIDE 2 MG: 1 INJECTION, SOLUTION INTRAMUSCULAR; INTRAVENOUS at 13:37

## 2020-02-06 RX ADMIN — ACETAMINOPHEN 1000 MG: 500 TABLET ORAL at 23:00

## 2020-02-06 RX ADMIN — Medication 80 MG: at 13:45

## 2020-02-06 RX ADMIN — FENTANYL CITRATE 25 MCG: 50 INJECTION, SOLUTION INTRAMUSCULAR; INTRAVENOUS at 16:55

## 2020-02-06 RX ADMIN — CELECOXIB 400 MG: 200 CAPSULE ORAL at 11:07

## 2020-02-06 RX ADMIN — DOCUSATE SODIUM 100 MG: 100 CAPSULE, LIQUID FILLED ORAL at 18:31

## 2020-02-06 RX ADMIN — FAMOTIDINE 20 MG: 20 TABLET ORAL at 18:31

## 2020-02-06 RX ADMIN — ONDANSETRON 4 MG: 2 INJECTION INTRAMUSCULAR; INTRAVENOUS at 16:27

## 2020-02-06 RX ADMIN — DEXAMETHASONE SODIUM PHOSPHATE 4 MG: 4 INJECTION, SOLUTION INTRA-ARTICULAR; INTRALESIONAL; INTRAMUSCULAR; INTRAVENOUS; SOFT TISSUE at 13:48

## 2020-02-06 RX ADMIN — CEFAZOLIN 2 G: 330 INJECTION, POWDER, FOR SOLUTION INTRAMUSCULAR; INTRAVENOUS at 14:01

## 2020-02-06 RX ADMIN — CELECOXIB 200 MG: 200 CAPSULE ORAL at 18:31

## 2020-02-06 RX ADMIN — FENTANYL CITRATE 50 MCG: 50 INJECTION, SOLUTION INTRAMUSCULAR; INTRAVENOUS at 16:40

## 2020-02-06 RX ADMIN — HYDROMORPHONE HYDROCHLORIDE 0.5 MG: 2 INJECTION, SOLUTION INTRAMUSCULAR; INTRAVENOUS; SUBCUTANEOUS at 14:05

## 2020-02-06 RX ADMIN — Medication 0.5 ML: at 10:45

## 2020-02-06 RX ADMIN — FENTANYL CITRATE 50 MCG: 50 INJECTION, SOLUTION INTRAMUSCULAR; INTRAVENOUS at 17:08

## 2020-02-06 RX ADMIN — HYDROMORPHONE HYDROCHLORIDE 1 MG: 2 INJECTION, SOLUTION INTRAMUSCULAR; INTRAVENOUS; SUBCUTANEOUS at 13:38

## 2020-02-06 SDOH — HEALTH STABILITY: MENTAL HEALTH: HOW OFTEN DO YOU HAVE 6 OR MORE DRINKS ON ONE OCCASION?: NEVER

## 2020-02-06 SDOH — HEALTH STABILITY: MENTAL HEALTH: HOW OFTEN DO YOU HAVE A DRINK CONTAINING ALCOHOL?: 4 OR MORE TIMES A WEEK

## 2020-02-06 SDOH — HEALTH STABILITY: MENTAL HEALTH: HOW MANY STANDARD DRINKS CONTAINING ALCOHOL DO YOU HAVE ON A TYPICAL DAY?: 1 OR 2

## 2020-02-06 SDOH — ECONOMIC STABILITY: FOOD INSECURITY: WITHIN THE PAST 12 MONTHS, THE FOOD YOU BOUGHT JUST DIDN'T LAST AND YOU DIDN'T HAVE MONEY TO GET MORE.: NEVER TRUE

## 2020-02-06 SDOH — ECONOMIC STABILITY: TRANSPORTATION INSECURITY
IN THE PAST 12 MONTHS, HAS LACK OF TRANSPORTATION KEPT YOU FROM MEETINGS, WORK, OR FROM GETTING THINGS NEEDED FOR DAILY LIVING?: NO

## 2020-02-06 SDOH — ECONOMIC STABILITY: FOOD INSECURITY: WITHIN THE PAST 12 MONTHS, YOU WORRIED THAT YOUR FOOD WOULD RUN OUT BEFORE YOU GOT MONEY TO BUY MORE.: NEVER TRUE

## 2020-02-06 SDOH — ECONOMIC STABILITY: TRANSPORTATION INSECURITY
IN THE PAST 12 MONTHS, HAS THE LACK OF TRANSPORTATION KEPT YOU FROM MEDICAL APPOINTMENTS OR FROM GETTING MEDICATIONS?: NO

## 2020-02-06 ASSESSMENT — COGNITIVE AND FUNCTIONAL STATUS - GENERAL
SUGGESTED CMS G CODE MODIFIER DAILY ACTIVITY: CH
SUGGESTED CMS G CODE MODIFIER DAILY ACTIVITY: CH
DAILY ACTIVITIY SCORE: 24
DAILY ACTIVITIY SCORE: 24
MOBILITY SCORE: 24
MOBILITY SCORE: 24
SUGGESTED CMS G CODE MODIFIER MOBILITY: CH
SUGGESTED CMS G CODE MODIFIER MOBILITY: CH

## 2020-02-06 ASSESSMENT — LIFESTYLE VARIABLES
HOW MANY TIMES IN THE PAST YEAR HAVE YOU HAD 5 OR MORE DRINKS IN A DAY: 1
TOTAL SCORE: 2
ALCOHOL_USE: YES
TOTAL SCORE: 2
EVER FELT BAD OR GUILTY ABOUT YOUR DRINKING: YES
HAVE PEOPLE ANNOYED YOU BY CRITICIZING YOUR DRINKING: NO
HAVE YOU EVER FELT YOU SHOULD CUT DOWN ON YOUR DRINKING: YES
EVER_SMOKED: NEVER
EVER HAD A DRINK FIRST THING IN THE MORNING TO STEADY YOUR NERVES TO GET RID OF A HANGOVER: NO
ON A TYPICAL DAY WHEN YOU DRINK ALCOHOL HOW MANY DRINKS DO YOU HAVE: 3
CONSUMPTION TOTAL: POSITIVE
AVERAGE NUMBER OF DAYS PER WEEK YOU HAVE A DRINK CONTAINING ALCOHOL: 4
TOTAL SCORE: 2
DOES PATIENT WANT TO STOP DRINKING: NO

## 2020-02-06 ASSESSMENT — COPD QUESTIONNAIRES
HAVE YOU SMOKED AT LEAST 100 CIGARETTES IN YOUR ENTIRE LIFE: NO/DON'T KNOW
COPD SCREENING SCORE: 0
DO YOU EVER COUGH UP ANY MUCUS OR PHLEGM?: NO/ONLY WITH OCCASIONAL COLDS OR INFECTIONS
DURING THE PAST 4 WEEKS HOW MUCH DID YOU FEEL SHORT OF BREATH: NONE/LITTLE OF THE TIME
IN THE PAST 12 MONTHS DO YOU DO LESS THAN YOU USED TO BECAUSE OF YOUR BREATHING PROBLEMS: DISAGREE/UNSURE

## 2020-02-06 ASSESSMENT — PATIENT HEALTH QUESTIONNAIRE - PHQ9
2. FEELING DOWN, DEPRESSED, IRRITABLE, OR HOPELESS: NOT AT ALL
1. LITTLE INTEREST OR PLEASURE IN DOING THINGS: NOT AT ALL
SUM OF ALL RESPONSES TO PHQ9 QUESTIONS 1 AND 2: 0

## 2020-02-06 NOTE — LETTER
[facility]  GEN SURGERY TAE 4TH  [address]  [place] [address]  [phone]     February 7, 2020    Patient: Niranjan Richter   YOB: 1975   Date of Visit: 2/4/2020       To Whom It May Concern:    Niranjan Richter was seen and treated at [facility]. He was admitted 02/06/2020 and discharged home 02/07/2020. Please excuse him from work until cleared by his outpatient provider. Follow up is scheduled on 02/18/2020.    Sincerely,     DEBBI Morris.

## 2020-02-06 NOTE — ANESTHESIA PROCEDURE NOTES
Airway  Date/Time: 2/6/2020 1:47 PM  Performed by: Omar Hooks M.D.  Authorized by: Omar Hooks M.D.     Location:  OR  Urgency:  Elective  Indications for Airway Management:  Anesthesia  Spontaneous Ventilation: absent    Sedation Level:  Deep  Preoxygenated: Yes    Patient Position:  Sniffing  Final Airway Type:  Endotracheal airway  Final Endotracheal Airway:  ETT  Cuffed: Yes    Technique Used for Successful ETT Placement:  Direct laryngoscopy  Insertion Site:  Oral  Blade Type:  Arianne  Laryngoscope Blade/Videolaryngoscope Blade Size:  4  ETT Size (mm):  7.5  Measured from:  Teeth  ETT to Teeth (cm):  25  Placement Verified by: auscultation and capnometry    Cormack-Lehane Classification:  Grade I - full view of glottis  Number of Attempts at Approach:  1

## 2020-02-06 NOTE — ANESTHESIA PREPROCEDURE EVALUATION
Relevant Problems   No relevant active problems       Physical Exam    Airway   Mallampati: I  TM distance: >3 FB  Neck ROM: full       Cardiovascular - normal exam  Rhythm: regular  Rate: normal  (-) murmur     Dental - normal exam         Pulmonary - normal exam  Breath sounds clear to auscultation     Abdominal    Neurological - normal exam                 Anesthesia Plan    ASA 1       Plan - general       Airway plan will be ETT        Induction: intravenous    Postoperative Plan: Postoperative administration of opioids is intended.    Pertinent diagnostic labs and testing reviewed    Informed Consent:    Anesthetic plan and risks discussed with patient.    Use of blood products discussed with: patient whom consented to blood products.

## 2020-02-07 ENCOUNTER — APPOINTMENT (OUTPATIENT)
Dept: RADIOLOGY | Facility: MEDICAL CENTER | Age: 45
DRG: 030 | End: 2020-02-07
Attending: SURGERY
Payer: COMMERCIAL

## 2020-02-07 VITALS
RESPIRATION RATE: 17 BRPM | HEART RATE: 68 BPM | HEIGHT: 71 IN | BODY MASS INDEX: 21.7 KG/M2 | DIASTOLIC BLOOD PRESSURE: 89 MMHG | TEMPERATURE: 99 F | OXYGEN SATURATION: 98 % | SYSTOLIC BLOOD PRESSURE: 125 MMHG | WEIGHT: 154.98 LBS

## 2020-02-07 PROBLEM — G54.0 THORACIC OUTLET SYNDROME: Status: ACTIVE | Noted: 2020-02-07

## 2020-02-07 LAB — PATHOLOGY CONSULT NOTE: NORMAL

## 2020-02-07 PROCEDURE — 71045 X-RAY EXAM CHEST 1 VIEW: CPT

## 2020-02-07 PROCEDURE — 700102 HCHG RX REV CODE 250 W/ 637 OVERRIDE(OP): Performed by: SURGERY

## 2020-02-07 PROCEDURE — A9270 NON-COVERED ITEM OR SERVICE: HCPCS | Performed by: SURGERY

## 2020-02-07 PROCEDURE — 700112 HCHG RX REV CODE 229: Performed by: SURGERY

## 2020-02-07 RX ORDER — ACETAMINOPHEN 325 MG/1
325 TABLET ORAL EVERY 6 HOURS
COMMUNITY
Start: 2020-02-07 | End: 2020-02-11

## 2020-02-07 RX ORDER — IBUPROFEN 800 MG/1
800 TABLET ORAL 3 TIMES DAILY
COMMUNITY
Start: 2020-02-07 | End: 2020-02-11

## 2020-02-07 RX ADMIN — FAMOTIDINE 20 MG: 20 TABLET ORAL at 04:57

## 2020-02-07 RX ADMIN — DOCUSATE SODIUM 100 MG: 100 CAPSULE, LIQUID FILLED ORAL at 04:57

## 2020-02-07 RX ADMIN — ACETAMINOPHEN 1000 MG: 500 TABLET ORAL at 04:57

## 2020-02-07 RX ADMIN — CELECOXIB 200 MG: 200 CAPSULE ORAL at 04:57

## 2020-02-07 ASSESSMENT — ENCOUNTER SYMPTOMS
SPEECH CHANGE: 0
SHORTNESS OF BREATH: 0
SENSORY CHANGE: 1
TINGLING: 1
FOCAL WEAKNESS: 0
MYALGIAS: 1
FEVER: 0
CHILLS: 0

## 2020-02-07 NOTE — PROGRESS NOTES
Pt discharged to home with wife. IV d/c'd. Regular diet. Activity as tolerated. Pt and family understand verbal and written discharge instructions. Walked to  parking with wife, ambulates steadily. Pt agrees to return to hospital for worsening symptoms.     Axillary dressing changed prior to d/c. Extra tegaderms given to patient. Showed pt how to change axillary dressing. Work note also given with discharge instruction.

## 2020-02-07 NOTE — ANESTHESIA TIME REPORT
Anesthesia Start and Stop Event Times     Date Time Event    2/6/2020 1337 Ready for Procedure     1337 Anesthesia Start     1643 Anesthesia Stop        Responsible Staff  02/06/20    Name Role Begin End    Omar Hooks M.D. Anesth 1337 1643        Preop Diagnosis (Free Text):  Pre-op Diagnosis     THORACIC OUTLET SYNDROME        Preop Diagnosis (Codes):    Post op Diagnosis  Thoracic outlet syndrome      Premium Reason  A. 3PM - 7AM    Comments:

## 2020-02-07 NOTE — PROGRESS NOTES
Pt AO x 4  Vitals signs stable  Pt denies chest pain/SOB, N/V  Pt endorses 4/10 pain, pt prefers to use tylenol instead of opiates due to past dependence. Pt states pain is managed with tylenol.   O2 sat >90% on RA breathing unlabored  Surgical incision in R axillary, transparent dressing in place - CDI.   Last BM was on 2/6 prior to surgery.   Pt ambulates self with initial standby assist.     Admission assessment done. 2 RN skin check done. Pt oriented to unit.     Updated plan of care discussed with pt. Safety education done. Falls precautions in place.   Pt safety maintained. Hourly rounding done.

## 2020-02-07 NOTE — PROGRESS NOTES
Trauma / Surgical Daily Progress Note    Date of Service  2/7/2020    Chief Complaint  44 y.o. male admitted 2/6/2020 with THORACIC OUTLET SYNDROME    Interval Events  POD # 1 Transaxillary first rib resection, neurolysis of brachial plexus  Adequate pain control without narcotics or gabapentin  Declining gabapentin due to previous adverse reaction  RUE intermittent numbness/tingling, 5/5 strength  Incision clean, small drainage under Tegaderm  Discharge instructions reviewed    - Change dressing  - Disposition home      Review of Systems  Review of Systems   Constitutional: Negative for chills and fever.   Respiratory: Negative for shortness of breath.    Cardiovascular: Negative for chest pain.   Musculoskeletal: Positive for myalgias (right axilla at incision site).   Neurological: Positive for tingling (intermittent to right hand/fingers) and sensory change (intermittent numbness to right forearm, hand/fingers). Negative for speech change and focal weakness.        Vital Signs  Temp:  [36.4 °C (97.6 °F)-38 °C (100.4 °F)] 37.2 °C (99 °F)  Pulse:  [55-86] 68  Resp:  [9-21] 17  BP: (118-162)/() 125/89  SpO2:  [96 %-100 %] 98 %    Physical Exam  Physical Exam  Vitals signs and nursing note reviewed.   Constitutional:       General: He is awake. He is not in acute distress.     Appearance: He is well-developed. He is not ill-appearing.   Cardiovascular:      Pulses: Normal pulses.   Pulmonary:      Effort: Pulmonary effort is normal. No respiratory distress.   Musculoskeletal:         General: Tenderness (incision site) present.      Comments: Right axilla incision clean, small bloody drainage beneath Tegaderm dressing   Skin:     General: Skin is warm and dry.      Capillary Refill: Capillary refill takes less than 2 seconds.   Neurological:      General: No focal deficit present.      Mental Status: He is alert and oriented to person, place, and time.   Psychiatric:         Mood and Affect: Mood normal.          Behavior: Behavior normal. Behavior is cooperative.         Laboratory  Recent Results (from the past 24 hour(s))   Histology Request    Collection Time: 02/07/20  7:24 AM   Result Value Ref Range    Pathology Request Sent to Histo        Fluids    Intake/Output Summary (Last 24 hours) at 2/7/2020 0837  Last data filed at 2/7/2020 0410  Gross per 24 hour   Intake 2120 ml   Output 1160 ml   Net 960 ml       Core Measures & Quality Metrics  Labs reviewed, Medications reviewed and Radiology images reviewed  Archuleta catheter: No Archuleta      DVT Prophylaxis: Contraindicated - High bleeding risk  DVT prophylaxis - mechanical: SCDs  Ulcer prophylaxis: Not indicated    Assessed for rehab: Patient returned to prior level of function, rehabilitation not indicated at this time    YSABEL Score  ETOH Screening    Assessment/Plan  Neurogenic thoracic outlet syndrome- (present on admission)  Assessment & Plan  2/6 Transaxillary first rib resection, neurolysis of brachial plexus.  Blaze Mallory MD. Surgery.      Discussed patient condition with RN, , , Patient and general surgery. Dr. Mallory

## 2020-02-07 NOTE — PROGRESS NOTES
2 RN skin check complete    Surgical incision R axillary.   Scabs on BLE - skin intact.   18g L wrist.     No other skin issues. Pt turns self.

## 2020-02-07 NOTE — OR SURGEON
Immediate Post OP Note    PreOp Diagnosis: Thoracic Outlet Syndrome  PostOp Diagnosis: Same    Procedure(s):  EXCISION, RIB - FIRST RIGHT - Wound Class: Clean    Surgeon(s):  Blaze Mallory M.D.    Anesthesiologist/Type of Anesthesia:  Anesthesiologist: Omar Hooks M.D./General    Surgical Staff:  Circulator: Yarely Johnson R.N.  Scrub Person: Concepcion Solis Assist: DAVID Macias    Specimens removed if any:  ID Type Source Tests Collected by Time Destination   A : Right first rib Other Other PATHOLOGY SPECIMEN Blaze Mallory M.D. 2/6/2020  4:22 PM        Estimated Blood Loss: 50 ml    Findings: Nerve compression    Complications: None        2/6/2020 4:41 PM DAVID Macias

## 2020-02-07 NOTE — DISCHARGE PLANNING
Care Transition Team Assessment    LSW met with the patient at bedside.  The patient verified the accuracy of the demographic information in the facesheet.    The patient indicates he lives at home with JOSE Fox.  The patient is independent with ADLS and his PCP is Dr. Cao.    At this time, the anticipated discharge plan is home with no needs.    Information Source  Orientation : Oriented x 4  Information Given By: Patient  Informant's Name: (Niranjan)  Who is responsible for making decisions for patient? : Patient    Readmission Evaluation  Is this a readmission?: No    Elopement Risk  Legal Hold: No  Ambulatory or Self Mobile in Wheelchair: Yes  Disoriented: No  Psychiatric Symptoms: None  History of Wandering: No    Interdisciplinary Discharge Planning  Does Admitting Nurse Feel This Could be a Complex Discharge?: No  Lives with - Patient's Self Care Capacity: Significant Other  Patient or legal guardian wants to designate a caregiver (see row info): No  Support Systems: Family Member(s)  Do You Take your Prescribed Medications Regularly: Yes  Able to Return to Previous ADL's: Yes  Mobility Issues: No  Prior Services: None  Assistance Needed: No  Durable Medical Equipment: Not Applicable    Discharge Preparedness  What is your plan after discharge?: Home with help  What are your discharge supports?: Partner  Prior Functional Level: Independent with Activities of Daily Living    Functional Assesment  Prior Functional Level: Independent with Activities of Daily Living    Finances  Financial Barriers to Discharge: No  Prescription Coverage: Yes    Vision / Hearing Impairment  Vision Impairment : No  Hearing Impairment : No         Advance Directive  Advance Directive?: None  Advance Directive offered?: AD Booklet refused    Domestic Abuse  Have you ever been the victim of abuse or violence?: No  Physical Abuse or Sexual Abuse: No  Verbal Abuse or Emotional Abuse: No  Possible Abuse Reported to:: Not  Applicable    Psychological Assessment  History of Substance Abuse: None  History of Psychiatric Problems: No    Discharge Risks or Barriers  Discharge risks or barriers?: No    Anticipated Discharge Information  Anticipated discharge disposition: Home

## 2020-02-07 NOTE — ANESTHESIA POSTPROCEDURE EVALUATION
Patient: Niranjan Richter    Procedure Summary     Date:  02/06/20 Room / Location:  [facility] OR 09 / SURGERY [facility]    Anesthesia Start:  1337 Anesthesia Stop:  1643    Procedure:  EXCISION, RIB - FIRST RIGHT (Right Chest) Diagnosis:  (THORACIC OUTLET SYNDROME)    Surgeon:  Blaze Mallory M.D. Responsible Provider:  Omar Hooks M.D.    Anesthesia Type:  general ASA Status:  1          Final Anesthesia Type: general  Last vitals  BP   Blood Pressure: 147/92    Temp   36.7 °C (98 °F)    Pulse   Pulse: 66   Resp   14    SpO2   100 %      Anesthesia Post Evaluation    Patient location during evaluation: PACU  Patient participation: complete - patient participated  Level of consciousness: awake and alert    Airway patency: patent  Anesthetic complications: no  Cardiovascular status: hemodynamically stable  Respiratory status: acceptable  Hydration status: euvolemic    PONV: none           Nurse Pain Score: 6 (NPRS)

## 2020-02-07 NOTE — OP REPORT
DATE OF SERVICE:  02/06/2020    PREOPERATIVE DIAGNOSIS:  Neurogenic thoracic outlet syndrome.    POSTOPERATIVE DIAGNOSIS:  Neurogenic thoracic outlet syndrome.    NAME OF OPERATIONS:  1.  Transaxillary first rib resection.  2.  Neurolysis of brachial plexus.    SURGEON:  Blaze Mallory MD    ASSISTANT:  Elba Griffiths DNP    ANESTHESIA:  General.    INDICATIONS:  Very symptomatic thoracic outlet syndrome.  Positive Adson's   with ablation of the pulse and duplication of symptoms including numbness and   pain down the arm and into the hand.  He also has marked symptoms with   palpation of the supraclavicular fossa.  Procedure, alternatives, risks, and   disability were discussed with the patient.  Specifically, discussed risk of   bleeding, infection, injury to regional nerves and blood vessels.  Injury to   nerves that could result in permanent neurologic deficit and paralysis.  The   patient's questions were answered and he wished to proceed.    DESCRIPTION OF OPERATION:  The patient was placed in decubitus position and   the chest and arm prepped and draped in sterile fashion.  A transverse   axillary incision was made at the hairline.  A plane was developed at the   chest wall and carried up to the level of the first rib.  Renal vein retractor   and lighted mammary retractor were used to expose the first rib.  Care was   taken to avoid undue pressure traction on the neurovascular bundle.     Neurolysis/nerve decompression: The anterior and medial scalenes were identified at their insertion site to the   first rib.  Brachial plexus could be seen compressed by the scalenes and   draped over the rib.  The nerves were carefully mobilized away from these   structures using blunt and Kitner dissection.  Exposure was difficult and required repositioning of the retractors that were reflecting the neurovascular bundle.     The muscles were hypertrophied.  A right angle was then placed around the scalenes and these  were subsequently divided using Harmonic   scissors.  Care was taken to avoid use of energy adjacent to the nerves and   nerves were clearly behind retractors when the scalenes were being divided.      Once the scalenes have been divided, the intercostal muscles were divided at   the inferior aspect of the rib.  A plane was developed between the rib and the   pleura using a Choudhary instrument.  This enabled visualization of the inferior   and superior aspects of the medial rib.  The neurovascular bundle was then   retracted.  The subclavian vein was under direct vision exiting the chest and   a rib shear was then placed and the medial rib was then divided using a rib   shear.  This process was repeated posteriorly.  Here, the nerves were   carefully retracted away from the rib and a rib shear was then used to divide   the rib posteriorly.  The rib remnant was then resected further using   rongeurs.  This provided an excellent decompression of the brachial plexus and   neurovascular bundle.  The wound was filled with saline.  There was no   leakage through the pleura.  This was evacuated.  There was no active   hemorrhage at the time of closure.  Sponge and needle counts were correct x2.    The axillary contents and subcutaneous tissues were closed using interrupted   3-0 Vicryl and the skin was closed using a running 4-0 Monocryl subcuticular   suture.  Patient tolerated the procedure well and was taken to recovery room   in stable condition.  In the recovery room, the patient was moving all basic   motor groups.  The hand was well perfused.       ____________________________________     MD BEAU PHILIP / ZULAY    DD:  02/06/2020 17:21:03  DT:  02/06/2020 18:51:55    D#:  [id number]  Job#:  [id number]

## 2020-02-07 NOTE — DISCHARGE INSTRUCTIONS
Discharge Instructions    Discharged to home by car with relative. Discharged via wheelchair, hospital escort: Refused.  Special equipment needed: Not Applicable      Discharge instructions:    1. DIET: Upon discharge from the hospital you may resume your normal preoperative diet. Depending on how you are feeling and whether you have nausea or not, you may wish to stay with a bland diet for the first few days. However, you can advance this as quickly as you feel ready.    2. ACTIVITIES: After discharge from the hospital, you may resume full routine activities. However, there should be no heavy lifting (greater than 15 pounds) and no strenuous activities until after your follow-up visit. Otherwise, routine activities of daily living are acceptable.    3. DRIVING: You may drive whenever you are off pain medications and are able to perform the activities needed to drive, i.e. turning, bending, twisting, etc.    4. BATHING: You may get the wound wet at any time after leaving the hospital. You may shower, but do not submerge in a bath for at least a week. Dressings may come off after 48 hours.    5. BOWEL FUNCTION: Constipation is common after an operation, especially with pain medications. The combination of pain medication and decreased activity level can cause constipation in otherwise normal patients. If you feel this is occurring, take a laxative (Milk of Magnesia, Ex-Lax, Senokot, etc.) until the problem has resolved.    6. PAIN MEDICATION: You may take over the counter acetaminophen and ibuprofen (per package insert). You may want to take these scheduled for the first few days and then as needed. Do not take Mobic and ibuprofen concurrently, may resume outpatient Mobic when no longer taking ibuprofen.    7.CALL IF YOU HAVE: (1) Fevers to more than 101F, (2) Unusual chest or leg pain, (3) Drainage or fluid from incision that may be foul smelling, increased tenderness or soreness at the wound or the wound edges are  no longer together, redness or swelling at the incision site. Please do not hesitate to call with any other questions.    Discharge Plan:   Diet Plan: Discussed  Activity Level: Discussed  Confirmed Follow up Appointment: Patient to Call and Schedule Appointment  Confirmed Symptoms Management: Discussed  Medication Reconciliation Updated: Yes  Influenza Vaccine Indication: Patient Refuses    I understand that a diet low in cholesterol, fat, and sodium is recommended for good health. Unless I have been given specific instructions below for another diet, I accept this instruction as my diet prescription.   Other diet: Regular    Special Instructions: None    · Is patient discharged on Warfarin / Coumadin?   No     Depression / Suicide Risk    As you are discharged from this [facility] facility, it is important to learn how to keep safe from harming yourself.    Recognize the warning signs:  · Abrupt changes in personality, positive or negative- including increase in energy   · Giving away possessions  · Change in eating patterns- significant weight changes-  positive or negative  · Change in sleeping patterns- unable to sleep or sleeping all the time   · Unwillingness or inability to communicate  · Depression  · Unusual sadness, discouragement and loneliness  · Talk of wanting to die  · Neglect of personal appearance   · Rebelliousness- reckless behavior  · Withdrawal from people/activities they love  · Confusion- inability to concentrate     If you or a loved one observes any of these behaviors or has concerns about self-harm, here's what you can do:  · Talk about it- your feelings and reasons for harming yourself  · Remove any means that you might use to hurt yourself (examples: pills, rope, extension cords, firearm)  · Get professional help from the community (Mental Health, Substance Abuse, psychological counseling)  · Do not be alone:Call your Safe Contact- someone whom you trust who will be there for  you.  · Call your local CRISIS HOTLINE 623-2752 or 684-181-7995  · Call your local Children's Mobile Crisis Response Team [place] (281) 363-9241 or www.Anybots  · Call the toll free National Suicide Prevention Hotlines   · National Suicide Prevention Lifeline 359-181-STTY (7113)  · [place] 800-SUICIDE (817-5903)      Thoracic Outlet Syndrome Surgery  Thoracic outlet syndrome surgery consists of one or two procedures to treat thoracic outlet syndrome (TOS). TOS is a group of signs and symptoms that result when blood vessels or nerves that supply the arm and hand are squeezed (compressed). The goal of this procedure is to reduce compression that causes TOS. During TOS surgery, you may have one or both of these procedures done:  · Scalenectomy. This is the removal of a muscle that passes through the thoracic outlet (scalene muscle).  · Rib resection. This is the removal of a small rib bone near the thoracic outlet.  Tell a health care provider about:  · Any allergies you have.  · All medicines you are taking, including vitamins, herbs, eye drops, creams, and over-the-counter medicines.  · Any problems you or family members have had with anesthetic medicines.  · Any blood disorders you have.  · Any surgeries you have had.  · Any medical conditions you have.  · Whether you are pregnant or may be pregnant.  · Any recent symptoms of colds or infections.  What are the risks?  Generally, this is a safe procedure. However, problems may occur, including:  · Infection.  · Bleeding.  · Allergic reactions to medicines.  · Damage to other structures or organs, such as the lungs or the nerves and blood vessels in the thoracic outlet.  · Scar tissue. This may cause symptoms to return.  What happens before the procedure?  · Ask your health care provider about:  ¨ Changing or stopping your regular medicines. This is especially important if you are taking diabetes medicines or blood thinners.  ¨ Taking  medicines such as aspirin and ibuprofen. These medicines can thin your blood. Do not take these medicines before your procedure if your health care provider instructs you not to.  · Follow instructions from your health care provider about eating or drinking restrictions.  · Ask your health care provider how your surgical site will be marked or identified.  · You may be given antibiotic medicine to help prevent infection.  · You may have a physical exam.  · You may have tests, including:  ¨ Blood tests.  ¨ Urine tests.  ¨ X-rays.  ¨ MRI.  ¨ CT scan.  ¨ Electrocardiogram (ECG).  ¨ Electromyogram (EMG).  · Plan to have someone take you home after the procedure.  · If you will be going home right after the procedure, plan to have someone with you for 24 hours.  What happens during the procedure?  · To reduce your risk of infection:  ¨ Your health care team will wash or sanitize their hands.  ¨ Your skin will be washed with soap.  · An IV tube will be inserted into one of your veins.  · You will be given a medicine to make you fall asleep (general anesthetic). You may also be given a medicine to help you relax (sedative).  · An incision will be made in your chest. The location of the incision varies depending on what type of procedure will be done:  ¨ For a scalenectomy, an incision will be made just above your clavicle.  ¨ For a rib resection, an incision will be made under your arm.  · If you are having a scalenectomy, the scalene muscle will be cut and removed. If you are having a rib resection, the rib will be cut and removed.  · Your incision will be closed with stiches (sutures), skin glue, or adhesive tape.  · Your incision will be covered with bandages (dressings).  The procedure may vary among health care providers and hospitals.  What happens after the procedure?  · You may continue to receive fluids and medicines through an IV tube.  · You will have some pain. Pain medicines will be available to help  you.  · Your blood pressure, heart rate, breathing rate, and blood oxygen level will be monitored often until the medicines you were given have worn off.  · You may have to wear a sling to keep your arm from moving.  · You may be shown exercises to help your arm heal. You may be referred to a physical therapist.  · Do not drive for 24 hours if you received a sedative.  This information is not intended to replace advice given to you by your health care provider. Make sure you discuss any questions you have with your health care provider.  Document Released: 10/14/2010 Document Revised: 08/19/2017 Document Reviewed: 12/11/2016  [employer] Interactive Patient Education © 2017 [employer]

## 2020-02-07 NOTE — CARE PLAN
Problem: Safety  Goal: Will remain free from injury  Outcome: PROGRESSING AS EXPECTED  Note:   Pt oriented to unit. Pt educated on call bell use, standby assist to bathroom. Pt demonstrates understanding of safety/fall precautions. Pt safety maintained.      Problem: Infection  Goal: Will remain free from infection  Outcome: PROGRESSING AS EXPECTED  Note:   Hand hygiene complete. No s/s of infection.

## 2020-02-07 NOTE — PROGRESS NOTES
Patient is AO x 4, RASS -1, and had 8/10 post-op soreness/tightness treated with IV/PO analgesics.  Surgical site CDI.  VSS on 2L NC, RR 12-14.  Dr. Mallory examined patient at bedside.  Some residual RUE weakness noted by MD.  Patient states he has 5/10 numbness w/ pain to RUE radiating to finger tips.  Pain was present pre-operatively / MD aware.  CXR completed in PACU.  POC reviewed, PIV veriifed, and safety protocols in place.  Wife Dominique updated via Epic text and by Dr. Mallory (aware of room assignment / ETA transfer).    Report to Courtney SANTOS.  Transported with 1L NC, spO2 97%, RR 12-14, tank @ 80%.

## 2020-02-07 NOTE — CARE PLAN
Problem: Safety  Goal: Will remain free from injury  Outcome: PROGRESSING AS EXPECTED  Goal: Will remain free from falls  Outcome: PROGRESSING AS EXPECTED  Fall precautions in place. Educated to call for assistance.      Problem: Pain Management  Goal: Pain level will decrease to patient's comfort goal  Outcome: PROGRESSING AS EXPECTED   Sling placed for comfort. Ice packs encouraged.

## 2020-02-07 NOTE — PROGRESS NOTES
Radiology notified this RN regarding small right pneumothorax this AM.   Pt assessed - he denies SOB and pain.   NAMAN Morris notified. Will continue to monitor patient.

## 2020-03-11 NOTE — NON-PROVIDER
PAT call attempted 3/11/220 at 13:51. Msg left at number provided and call back number and instructions to reschedule appt is ill or with any respiratory symptoms.

## 2020-03-11 NOTE — NON-PROVIDER
Pt returned PAT call. First and lst name and  verified. Respiratory/illness screen negative. Arrival and appt times, location of facility and procedure confirmed. Allergies and medications reviewed. pre-procedure instructions gone over with pt, pt verbalizes understanding. Pt takes meloxicam with last dose 3/8/2020. Pt questions answered.

## 2020-03-12 ENCOUNTER — HOSPITAL ENCOUNTER (OUTPATIENT)
Dept: RADIOLOGY | Facility: REHABILITATION | Age: 45
End: 2020-03-12
Attending: PHYSICAL MEDICINE & REHABILITATION
Payer: COMMERCIAL

## 2020-03-12 ENCOUNTER — HOSPITAL ENCOUNTER (OUTPATIENT)
Dept: PAIN MANAGEMENT | Facility: REHABILITATION | Age: 45
End: 2020-03-12
Attending: PHYSICAL MEDICINE & REHABILITATION
Payer: COMMERCIAL

## 2020-03-12 VITALS
DIASTOLIC BLOOD PRESSURE: 97 MMHG | TEMPERATURE: 97.7 F | BODY MASS INDEX: 21.57 KG/M2 | HEART RATE: 70 BPM | RESPIRATION RATE: 16 BRPM | WEIGHT: 154.1 LBS | SYSTOLIC BLOOD PRESSURE: 144 MMHG | HEIGHT: 71 IN | OXYGEN SATURATION: 98 %

## 2020-03-12 PROCEDURE — 700111 HCHG RX REV CODE 636 W/ 250 OVERRIDE (IP)

## 2020-03-12 PROCEDURE — 99152 MOD SED SAME PHYS/QHP 5/>YRS: CPT

## 2020-03-12 PROCEDURE — 64635 DESTROY LUMB/SAC FACET JNT: CPT

## 2020-03-12 PROCEDURE — 64636 DESTROY L/S FACET JNT ADDL: CPT

## 2020-03-12 RX ORDER — MIDAZOLAM HYDROCHLORIDE 1 MG/ML
INJECTION INTRAMUSCULAR; INTRAVENOUS
Status: COMPLETED
Start: 2020-03-12 | End: 2020-03-12

## 2020-03-12 RX ORDER — KETOROLAC TROMETHAMINE 30 MG/ML
INJECTION, SOLUTION INTRAMUSCULAR; INTRAVENOUS
Status: COMPLETED
Start: 2020-03-12 | End: 2020-03-12

## 2020-03-12 RX ORDER — LIDOCAINE HYDROCHLORIDE 20 MG/ML
INJECTION, SOLUTION EPIDURAL; INFILTRATION; INTRACAUDAL; PERINEURAL
Status: COMPLETED
Start: 2020-03-12 | End: 2020-03-12

## 2020-03-12 RX ORDER — LIDOCAINE HYDROCHLORIDE 10 MG/ML
INJECTION, SOLUTION EPIDURAL; INFILTRATION; INTRACAUDAL; PERINEURAL
Status: COMPLETED
Start: 2020-03-12 | End: 2020-03-12

## 2020-03-12 RX ADMIN — MIDAZOLAM HYDROCHLORIDE 0.5 MG: 1 INJECTION, SOLUTION INTRAMUSCULAR; INTRAVENOUS at 14:44

## 2020-03-12 RX ADMIN — LIDOCAINE HYDROCHLORIDE 10 ML: 10 INJECTION, SOLUTION EPIDURAL; INFILTRATION; INTRACAUDAL; PERINEURAL at 14:45

## 2020-03-12 RX ADMIN — FENTANYL CITRATE 25 MCG: 50 INJECTION, SOLUTION INTRAMUSCULAR; INTRAVENOUS at 14:44

## 2020-03-12 RX ADMIN — KETOROLAC TROMETHAMINE 30 MG: 30 INJECTION, SOLUTION INTRAMUSCULAR; INTRAVENOUS at 14:55

## 2020-03-12 ASSESSMENT — FIBROSIS 4 INDEX: FIB4 SCORE: 0.69

## 2020-03-12 NOTE — PROCEDURES
Preop diagnosis: Facetogenic pain/Spondylosis L4-L5, L5-S1    Post op diagnosis: Facetogenic pain/Spondylosis L4-L5, L5-S1    Procedure performed: Radiofrequency ablation right L4, L5, S1    Procedure:  After full consent the patient was escorted to the procedure room utilizing conscious sedation IV the patient was given Versed as well as fentanyl and monitored throughout the procedure with O2 sats vital signs and clinically. The fluoroscopic unit was then moved in position with good visualization of the medial branches denervate the facets at  L4,-L5 and L5-S1.  He spots were then clearly marked the area was then cleansed and prepped in normal sterile fashion a utilizing a 27-gauge needle utilizing 1 mL of 1% lidocaine for each spot after negative aspiration these areas were then anesthetized. The radiofrequency needle needle was then introduced and guided down under fluoroscopic guidance to the medial branch of right   L4, L5, S1. At each medial branch motor was carried out with good twitch response at each level the stylet was then removed and 2 mL of 2% lidocaine was then injected after negative aspiration. The stylette was then reintroduced and radiofrequency ablations were carried out at each level  at 80°C  for 90 seconds. After the radiofrequency ablation another 1 mL of 2% lidocaine was then injected after negative aspiration the needle was then withdrawn completely pressure applied area cleansed and Band-Aids placed the patient tolerated the procedure very well and was escorted to the postoperative room they were monitored for vital signs as well as hemodynamically and clinically and given a followup appointment and then discharged.      Gildardo Street M.D.

## 2020-03-12 NOTE — NON-PROVIDER
Pt given verbal and written d/c instructions including verbal infection prevention information and s/s of post procedure infection and verbalizes understanding. denies nsaid use in last 3 days, denies blood thinners use in last 5 days, denies current infection or abx use. pt has screws to right foot. Med rec complete. Pt has post procedural ride home with Segun

## 2020-03-12 NOTE — NON-PROVIDER
"Pt slightly sweaty after IV placement, reports \"overheating\" and \"feeling anxious\".  80 on admission, Legs elevated and chair reclined. Wet washcloth to forehead and ice pack placed behind neck. Emesis bag provided. HR 48=57, HR was 80 on admission) O2 99%, B/p 127/92 approx 4 min after elevating feet and reclining.  "

## 2020-03-26 ENCOUNTER — APPOINTMENT (OUTPATIENT)
Dept: PAIN MANAGEMENT | Facility: REHABILITATION | Age: 45
End: 2020-03-26
Attending: PHYSICAL MEDICINE & REHABILITATION
Payer: COMMERCIAL

## 2020-06-16 NOTE — NON-PROVIDER
PAT call made 6-16-20 at 0125. Patient informed of new procedure with admission. Patient denied any symptoms of covid, or exposure. 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 Told him to take higher precautions.

## 2020-06-18 ENCOUNTER — HOSPITAL ENCOUNTER (OUTPATIENT)
Dept: PAIN MANAGEMENT | Facility: REHABILITATION | Age: 45
End: 2020-06-18
Attending: PHYSICAL MEDICINE & REHABILITATION
Payer: COMMERCIAL

## 2020-06-18 ENCOUNTER — HOSPITAL ENCOUNTER (OUTPATIENT)
Dept: RADIOLOGY | Facility: REHABILITATION | Age: 45
End: 2020-06-18
Attending: PHYSICAL MEDICINE & REHABILITATION
Payer: COMMERCIAL

## 2020-06-18 VITALS
OXYGEN SATURATION: 100 % | BODY MASS INDEX: 20.77 KG/M2 | TEMPERATURE: 98.4 F | HEIGHT: 71 IN | RESPIRATION RATE: 17 BRPM | WEIGHT: 148.37 LBS | HEART RATE: 77 BPM | DIASTOLIC BLOOD PRESSURE: 94 MMHG | SYSTOLIC BLOOD PRESSURE: 130 MMHG

## 2020-06-18 PROCEDURE — 700111 HCHG RX REV CODE 636 W/ 250 OVERRIDE (IP)

## 2020-06-18 PROCEDURE — 64635 DESTROY LUMB/SAC FACET JNT: CPT

## 2020-06-18 PROCEDURE — 99152 MOD SED SAME PHYS/QHP 5/>YRS: CPT

## 2020-06-18 PROCEDURE — 64636 DESTROY L/S FACET JNT ADDL: CPT

## 2020-06-18 PROCEDURE — 700101 HCHG RX REV CODE 250

## 2020-06-18 RX ORDER — KETOROLAC TROMETHAMINE 30 MG/ML
INJECTION, SOLUTION INTRAMUSCULAR; INTRAVENOUS
Status: COMPLETED
Start: 2020-06-18 | End: 2020-06-18

## 2020-06-18 RX ORDER — MIDAZOLAM HYDROCHLORIDE 1 MG/ML
INJECTION INTRAMUSCULAR; INTRAVENOUS
Status: COMPLETED
Start: 2020-06-18 | End: 2020-06-18

## 2020-06-18 RX ORDER — LIDOCAINE HYDROCHLORIDE 20 MG/ML
INJECTION, SOLUTION EPIDURAL; INFILTRATION; INTRACAUDAL; PERINEURAL
Status: COMPLETED
Start: 2020-06-18 | End: 2020-06-18

## 2020-06-18 RX ADMIN — MIDAZOLAM HYDROCHLORIDE 1 MG: 1 INJECTION, SOLUTION INTRAMUSCULAR; INTRAVENOUS at 14:20

## 2020-06-18 RX ADMIN — FENTANYL CITRATE 50 MCG: 50 INJECTION, SOLUTION INTRAMUSCULAR; INTRAVENOUS at 14:20

## 2020-06-18 RX ADMIN — KETOROLAC TROMETHAMINE 30 MG: 30 INJECTION, SOLUTION INTRAMUSCULAR; INTRAVENOUS at 14:29

## 2020-06-18 RX ADMIN — LIDOCAINE HYDROCHLORIDE 10 ML: 20 INJECTION, SOLUTION EPIDURAL; INFILTRATION; INTRACAUDAL; PERINEURAL at 14:27

## 2020-06-18 ASSESSMENT — FIBROSIS 4 INDEX: FIB4 SCORE: 0.69

## 2020-06-18 NOTE — PROCEDURES
Preop diagnosis: Facetogenic pain left L4-5 and L5-S1    Post op diagnosis: Facetogenic pain left L4-5 and L5-S1    Procedure performed: Radiofrequency ablation Left L4, L5, S1    Procedure:  After full consent the patient was escorted to the procedure room utilizing conscious sedation IV the patient was given Versed as well as fentanyl and monitored throughout the procedure with O2 sats vital signs and clinically. The fluoroscopic unit was then moved in position with good visualization of the medial branches denervate the facets atleft L4-5 and L5-S1  .  He spots were then clearly marked the area was then cleansed and prepped in normal sterile fashion a utilizing a 27-gauge needle utilizing 1 mL of 1% lidocaine for each spot after negative aspiration these areas were then anesthetized. The radiofrequency needle needle was then introduced and guided down under fluoroscopic guidance to the medial branch of  Left L4, L5, S1. At each medial branch motor was carried out with good twitch response at each level the stylet was then removed and 2 mL of 2% lidocaine was then injected after negative aspiration. The stylette was then reintroduced and radiofrequency ablations were carried out at each level  at 80°C  for 90 seconds. After the radiofrequency ablation another 1 mL of 2% lidocaine was then injected after negative aspiration the needle was then withdrawn completely pressure applied area cleansed and Band-Aids placed the patient tolerated the procedure very well and was escorted to the postoperative room they were monitored for vital signs as well as hemodynamically and clinically and given a followup appointment and then discharged.      Gildardo Street M.D.

## 2020-06-18 NOTE — NON-PROVIDER
Patient name verified. Procedure and site confirmed, informed consent signed.Has updated H&P. Medication allergies and current medications reviewed in epic. Has   Dominique  / spouse.   Printed home care discharged instruction as well as education regarding pre- post infection prevention  verbal given and understood by him.Had Mobic yesterday .Handoff given to LUANNE Rice RN and Dr. Street.

## 2020-06-18 NOTE — NON-PROVIDER
Fluids  and food tolerated well. Ice compress applied to the affected area.  Discharged ambulatory without difficulty with designated .

## 2020-06-18 NOTE — NON-PROVIDER
Pre-procedure assessment completed  and pertinent health information (mobic yesterday, metal in right foot) communicated to physician and staff during timeout. Pt health history unremarkable. Pt positionned pre-procedure on table by RN, CST and xray tech. Pillow placed under feet for support.Grounding pad applied to right buttock by CST and verified by RN.

## 2020-08-03 ENCOUNTER — HOSPITAL ENCOUNTER (OUTPATIENT)
Facility: MEDICAL CENTER | Age: 45
End: 2020-08-03
Attending: NEUROLOGICAL SURGERY | Admitting: NEUROLOGICAL SURGERY
Payer: COMMERCIAL

## 2020-08-21 ENCOUNTER — PRE-ADMISSION TESTING (OUTPATIENT)
Dept: ADMISSIONS | Facility: MEDICAL CENTER | Age: 45
End: 2020-08-21
Attending: NEUROLOGICAL SURGERY
Payer: COMMERCIAL

## 2020-08-21 ENCOUNTER — HOSPITAL ENCOUNTER (OUTPATIENT)
Dept: RADIOLOGY | Facility: MEDICAL CENTER | Age: 45
End: 2020-08-21
Attending: NEUROLOGICAL SURGERY | Admitting: NEUROLOGICAL SURGERY
Payer: COMMERCIAL

## 2020-08-21 VITALS — WEIGHT: 146.83 LBS | HEIGHT: 71 IN | BODY MASS INDEX: 20.56 KG/M2

## 2020-08-21 DIAGNOSIS — Z01.811 PRE-OPERATIVE RESPIRATORY EXAMINATION: ICD-10-CM

## 2020-08-21 DIAGNOSIS — Z01.810 PRE-OPERATIVE CARDIOVASCULAR EXAMINATION: ICD-10-CM

## 2020-08-21 DIAGNOSIS — Z01.812 PRE-OPERATIVE LABORATORY EXAMINATION: ICD-10-CM

## 2020-08-21 LAB
25(OH)D3 SERPL-MCNC: 20 NG/ML (ref 30–100)
ABO GROUP BLD: NORMAL
ANION GAP SERPL CALC-SCNC: 10 MMOL/L (ref 7–16)
APPEARANCE UR: CLEAR
APTT PPP: 29.4 SEC (ref 24.7–36)
BASOPHILS # BLD AUTO: 0.6 % (ref 0–1.8)
BASOPHILS # BLD: 0.03 K/UL (ref 0–0.12)
BILIRUB UR QL STRIP.AUTO: NEGATIVE
BLD GP AB SCN SERPL QL: NORMAL
BUN SERPL-MCNC: 12 MG/DL (ref 8–22)
CALCIUM SERPL-MCNC: 9.5 MG/DL (ref 8.5–10.5)
CHLORIDE SERPL-SCNC: 102 MMOL/L (ref 96–112)
CO2 SERPL-SCNC: 27 MMOL/L (ref 20–33)
COLOR UR: YELLOW
COVID ORDER STATUS COVID19: NORMAL
CREAT SERPL-MCNC: 0.68 MG/DL (ref 0.5–1.4)
EKG IMPRESSION: NORMAL
EOSINOPHIL # BLD AUTO: 0.07 K/UL (ref 0–0.51)
EOSINOPHIL NFR BLD: 1.4 % (ref 0–6.9)
ERYTHROCYTE [DISTWIDTH] IN BLOOD BY AUTOMATED COUNT: 46.8 FL (ref 35.9–50)
GLUCOSE SERPL-MCNC: 97 MG/DL (ref 65–99)
GLUCOSE UR STRIP.AUTO-MCNC: NEGATIVE MG/DL
HCT VFR BLD AUTO: 42.3 % (ref 42–52)
HGB BLD-MCNC: 14.6 G/DL (ref 14–18)
IMM GRANULOCYTES # BLD AUTO: 0.01 K/UL (ref 0–0.11)
IMM GRANULOCYTES NFR BLD AUTO: 0.2 % (ref 0–0.9)
INR PPP: 1.23 (ref 0.87–1.13)
KETONES UR STRIP.AUTO-MCNC: ABNORMAL MG/DL
LEUKOCYTE ESTERASE UR QL STRIP.AUTO: NEGATIVE
LYMPHOCYTES # BLD AUTO: 1.15 K/UL (ref 1–4.8)
LYMPHOCYTES NFR BLD: 22.3 % (ref 22–41)
MCH RBC QN AUTO: 34.9 PG (ref 27–33)
MCHC RBC AUTO-ENTMCNC: 34.5 G/DL (ref 33.7–35.3)
MCV RBC AUTO: 101.2 FL (ref 81.4–97.8)
MICRO URNS: ABNORMAL
MONOCYTES # BLD AUTO: 0.27 K/UL (ref 0–0.85)
MONOCYTES NFR BLD AUTO: 5.2 % (ref 0–13.4)
NEUTROPHILS # BLD AUTO: 3.62 K/UL (ref 1.82–7.42)
NEUTROPHILS NFR BLD: 70.3 % (ref 44–72)
NITRITE UR QL STRIP.AUTO: NEGATIVE
NRBC # BLD AUTO: 0 K/UL
NRBC BLD-RTO: 0 /100 WBC
PH UR STRIP.AUTO: 6.5 [PH] (ref 5–8)
PLATELET # BLD AUTO: 282 K/UL (ref 164–446)
PMV BLD AUTO: 9 FL (ref 9–12.9)
POTASSIUM SERPL-SCNC: 4.8 MMOL/L (ref 3.6–5.5)
PROT UR QL STRIP: NEGATIVE MG/DL
PROTHROMBIN TIME: 15.9 SEC (ref 12–14.6)
RBC # BLD AUTO: 4.18 M/UL (ref 4.7–6.1)
RBC UR QL AUTO: NEGATIVE
RH BLD: NORMAL
SARS-COV-2 RNA RESP QL NAA+PROBE: NOTDETECTED
SODIUM SERPL-SCNC: 139 MMOL/L (ref 135–145)
SP GR UR STRIP.AUTO: 1.02
SPECIMEN SOURCE: NORMAL
UROBILINOGEN UR STRIP.AUTO-MCNC: 1 MG/DL
WBC # BLD AUTO: 5.2 K/UL (ref 4.8–10.8)

## 2020-08-21 PROCEDURE — 81003 URINALYSIS AUTO W/O SCOPE: CPT

## 2020-08-21 PROCEDURE — 85730 THROMBOPLASTIN TIME PARTIAL: CPT

## 2020-08-21 PROCEDURE — 86901 BLOOD TYPING SEROLOGIC RH(D): CPT

## 2020-08-21 PROCEDURE — 86850 RBC ANTIBODY SCREEN: CPT

## 2020-08-21 PROCEDURE — 93010 ELECTROCARDIOGRAM REPORT: CPT | Performed by: INTERNAL MEDICINE

## 2020-08-21 PROCEDURE — 71045 X-RAY EXAM CHEST 1 VIEW: CPT

## 2020-08-21 PROCEDURE — 85610 PROTHROMBIN TIME: CPT

## 2020-08-21 PROCEDURE — 93005 ELECTROCARDIOGRAM TRACING: CPT

## 2020-08-21 PROCEDURE — 36415 COLL VENOUS BLD VENIPUNCTURE: CPT

## 2020-08-21 PROCEDURE — 82306 VITAMIN D 25 HYDROXY: CPT

## 2020-08-21 PROCEDURE — U0003 INFECTIOUS AGENT DETECTION BY NUCLEIC ACID (DNA OR RNA); SEVERE ACUTE RESPIRATORY SYNDROME CORONAVIRUS 2 (SARS-COV-2) (CORONAVIRUS DISEASE [COVID-19]), AMPLIFIED PROBE TECHNIQUE, MAKING USE OF HIGH THROUGHPUT TECHNOLOGIES AS DESCRIBED BY CMS-2020-01-R: HCPCS

## 2020-08-21 PROCEDURE — 80048 BASIC METABOLIC PNL TOTAL CA: CPT

## 2020-08-21 PROCEDURE — 85025 COMPLETE CBC W/AUTO DIFF WBC: CPT

## 2020-08-21 PROCEDURE — 86900 BLOOD TYPING SEROLOGIC ABO: CPT

## 2020-08-21 ASSESSMENT — FIBROSIS 4 INDEX: FIB4 SCORE: 0.69

## 2020-09-18 ENCOUNTER — OFFICE VISIT (OUTPATIENT)
Dept: CARDIOLOGY | Facility: MEDICAL CENTER | Age: 45
End: 2020-09-18
Payer: COMMERCIAL

## 2020-09-18 VITALS
WEIGHT: 151.4 LBS | BODY MASS INDEX: 21.19 KG/M2 | SYSTOLIC BLOOD PRESSURE: 112 MMHG | HEIGHT: 71 IN | OXYGEN SATURATION: 95 % | DIASTOLIC BLOOD PRESSURE: 82 MMHG | HEART RATE: 110 BPM | RESPIRATION RATE: 12 BRPM

## 2020-09-18 DIAGNOSIS — R94.31 ABNORMAL ECG: ICD-10-CM

## 2020-09-18 DIAGNOSIS — Z01.810 PRE-OPERATIVE CARDIOVASCULAR EXAMINATION: ICD-10-CM

## 2020-09-18 PROBLEM — M48.061 SPINAL STENOSIS OF LUMBAR REGION: Status: ACTIVE | Noted: 2020-07-30

## 2020-09-18 PROBLEM — M54.50 LOW BACK PAIN: Status: ACTIVE | Noted: 2020-07-30

## 2020-09-18 PROBLEM — G54.0 THORACIC OUTLET SYNDROME: Status: ACTIVE | Noted: 2020-07-30

## 2020-09-18 LAB — EKG IMPRESSION: NORMAL

## 2020-09-18 PROCEDURE — 99204 OFFICE O/P NEW MOD 45 MIN: CPT | Performed by: INTERNAL MEDICINE

## 2020-09-18 PROCEDURE — 93000 ELECTROCARDIOGRAM COMPLETE: CPT | Performed by: INTERNAL MEDICINE

## 2020-09-18 ASSESSMENT — ENCOUNTER SYMPTOMS
IRREGULAR HEARTBEAT: 0
SORE THROAT: 0
FEVER: 0
HEADACHES: 0
CHILLS: 0
CLAUDICATION: 0
NUMBNESS: 1
NEAR-SYNCOPE: 0
PARESTHESIAS: 1
DECREASED APPETITE: 0
BLOATING: 0
PND: 0
DYSPNEA ON EXERTION: 0
BACK PAIN: 1
DIZZINESS: 1
NAUSEA: 0
SLEEP DISTURBANCES DUE TO BREATHING: 0
ORTHOPNEA: 0
BLURRED VISION: 0
COUGH: 0
VOMITING: 0
SYNCOPE: 0
ABDOMINAL PAIN: 0
BRUISES/BLEEDS EASILY: 0
DIAPHORESIS: 0
MYALGIAS: 0
WEAKNESS: 0
JOINT SWELLING: 0
SHORTNESS OF BREATH: 0
PALPITATIONS: 0

## 2020-09-18 ASSESSMENT — FIBROSIS 4 INDEX: FIB4 SCORE: 0.63

## 2020-09-18 NOTE — PROGRESS NOTES
Cardiology Initial Consultation Note    Date of note:    9/18/2020    Primary Care Provider: Kiley Foreman M.D.  Referring Provider: Vandana Whitfield M.D.     Patient Name: Niranjan Richter   YOB: 1975  MRN:              [id number]    Chief Complaint: Preoperative cardiology evaluation    History of Present Illness: Mr. Niranjan Richter is a 44 y.o. male whose current medical problems include thoracic outlet syndrome status post right first rib excision 2/6/2020, lumbar spinal stenosis with weakness in bilateral lower extremities who is here for cardiac consultation for preoperative cardiology evaluation prior to lumbar spine fusion surgery.    Mr. Richter currently denies any symptoms of chest pain, chest pressure dyspnea on exertion or palpitations.  He previously worked at a grocery store and was quite active without any concerning symptoms.  His exercise limitation is secondary to lower extremity weakness and lower back pain.  He is able to walk one block without having symptoms of chest pain/pressure or dyspnea.  Denies orthopnea, PND.    In 2013, he underwent exercise treadmill stress test due to symptoms of dyspnea which was negative for ischemia or other abnormality.    Cardiovascular Risk Factors:  1. Smoking status: Never smoker  2. Type II Diabetes Mellitus: No  3. Hypertension: No  4. Dyslipidemia:    Cholesterol,Tot   Date Value Ref Range Status   12/07/2018 171 100 - 199 mg/dL Final     LDL   Date Value Ref Range Status   12/07/2018 70 <100 mg/dL Final     HDL   Date Value Ref Range Status   12/07/2018 87 >=40 mg/dL Final     Triglycerides   Date Value Ref Range Status   12/07/2018 70 0 - 149 mg/dL Final     5. Family history of early Coronary Artery Disease in a first degree relative (Male less than 55 years of age; Female less than 65 years of age): No  6.  Obesity and/or Metabolic Syndrome: No  7. Sedentary lifestyle: No    Review of Systems   Constitution:  Negative for chills, decreased appetite, diaphoresis, fever and malaise/fatigue.   HENT: Negative for congestion and sore throat.    Eyes: Negative for blurred vision.   Cardiovascular: Negative for chest pain, claudication, dyspnea on exertion, irregular heartbeat, leg swelling, near-syncope, orthopnea, palpitations, paroxysmal nocturnal dyspnea and syncope.   Respiratory: Negative for cough, shortness of breath and sleep disturbances due to breathing.    Hematologic/Lymphatic: Does not bruise/bleed easily.   Musculoskeletal: Positive for back pain. Negative for joint swelling and myalgias.   Gastrointestinal: Negative for bloating, abdominal pain, nausea and vomiting.   Neurological: Positive for dizziness, numbness and paresthesias. Negative for headaches and weakness.     All other systems reviewed and discussed using a comprehensive questionnaire and are negative.       History reviewed. No pertinent past medical history.      Past Surgical History:   Procedure Laterality Date   • RIB RESECTION Right 2/6/2020    Procedure: EXCISION, RIB - FIRST RIGHT;  Surgeon: Blaze Mallory M.D.;  Location: SURGERY [facility];  Service: General   • OTHER  2020    thoracic outlet syndrome   • FOOT SURGERY Right 2004         Current Outpatient Medications   Medication Sig Dispense Refill   • methocarbamol (ROBAXIN) 750 MG Tab Take 750 mg by mouth 3 times a day.     • meloxicam (MOBIC) 15 MG tablet Take 15 mg by mouth every day.       No current facility-administered medications for this visit.          Allergies   Allergen Reactions   • Gabapentin Unspecified     Ended up in ER- shaking, convulsions         Family History   Problem Relation Age of Onset   • Stroke Mother    • No Known Problems Father          Social History     Socioeconomic History   • Marital status: Single     Spouse name: Not on file   • Number of children: Not on file   • Years of education: Not on file   • Highest education level: Not on file  "  Occupational History   • Not on file   Social Needs   • Financial resource strain: Not on file   • Food insecurity     Worry: Never true     Inability: Never true   • Transportation needs     Medical: No     Non-medical: No   Tobacco Use   • Smoking status: Never Smoker   • Smokeless tobacco: Never Used   Substance and Sexual Activity   • Alcohol use: Yes     Alcohol/week: 1.8 oz     Types: 2 Cans of beer, 1 Shots of liquor per week     Frequency: 4 or more times a week     Drinks per session: 1 or 2     Binge frequency: Never     Comment: 3 per day   • Drug use: Yes     Frequency: 2.0 times per week     Types: Marijuana, Oral, Inhaled     Comment: marijuana   • Sexual activity: Not on file   Lifestyle   • Physical activity     Days per week: Not on file     Minutes per session: Not on file   • Stress: Not on file   Relationships   • Social connections     Talks on phone: Not on file     Gets together: Not on file     Attends Christian service: Not on file     Active member of club or organization: Not on file     Attends meetings of clubs or organizations: Not on file     Relationship status: Not on file   • Intimate partner violence     Fear of current or ex partner: Not on file     Emotionally abused: Not on file     Physically abused: Not on file     Forced sexual activity: Not on file   Other Topics Concern   • Not on file   Social History Narrative   • Not on file         Physical Exam:  Ambulatory Vitals  /82 (BP Location: Left arm, Patient Position: Sitting, BP Cuff Size: Adult)   Pulse (!) 110   Resp 12   Ht 1.803 m (5' 11\")   Wt 68.7 kg (151 lb 6.4 oz)   SpO2 95%    Oxygen Therapy:  Pulse Oximetry: 95 %  BP Readings from Last 4 Encounters:   09/18/20 112/82   06/18/20 130/94   03/12/20 144/97   02/07/20 125/89       Weight/BMI: Body mass index is 21.12 kg/m².  Wt Readings from Last 4 Encounters:   09/18/20 68.7 kg (151 lb 6.4 oz)   06/18/20 67.3 kg (148 lb 5.9 oz)   03/12/20 69.9 kg (154 lb " 1.6 oz)   02/06/20 70.3 kg (154 lb 15.7 oz)         General: Well appearing and in no apparent distress  Eyes: nl conjunctiva, no icteric sclera  ENT: wearing a mask, normal external appearance of ears  Neck: no visible JVP,  no carotid bruits  Lungs: normal respiratory effort, CTAB  Heart: RRR, no murmurs, no rubs or gallops,  no edema bilateral lower extremities. No LV/RV heave on cardiac palpatation. + bilateral radial pulses.  + bilateral dp pulses.   Abdomen: soft, non tender, non distended, no masses, normal bowel sounds.  No HSM.  Extremities/MSK: no clubbing, no cyanosis  Neurological: No focal sensory deficits  Psychiatric: Appropriate affect, A/O x 3, intact judgement and insight  Skin: Warm extremities      Lab Data Review:  Lab Results   Component Value Date/Time    CHOLSTRLTOT 171 12/07/2018 02:23 PM    LDL 70 12/07/2018 02:23 PM    HDL 87 12/07/2018 02:23 PM    TRIGLYCERIDE 70 12/07/2018 02:23 PM       Lab Results   Component Value Date/Time    SODIUM 139 08/21/2020 09:46 AM    POTASSIUM 4.8 08/21/2020 09:46 AM    CHLORIDE 102 08/21/2020 09:46 AM    CO2 27 08/21/2020 09:46 AM    GLUCOSE 97 08/21/2020 09:46 AM    BUN 12 08/21/2020 09:46 AM    CREATININE 0.68 08/21/2020 09:46 AM     Lab Results   Component Value Date/Time    ALKPHOSPHAT 73 05/31/2019 06:29 AM    ASTSGOT 17 05/31/2019 06:29 AM    ALTSGPT 17 05/31/2019 06:29 AM    TBILIRUBIN 0.4 05/31/2019 06:29 AM      Lab Results   Component Value Date/Time    WBC 5.2 08/21/2020 09:46 AM       Cardiac Imaging and Procedures Review:    EKG dated 9/18/2020: My personal interpretation is normal sinus rhythm with heart rate 59 bpm, nonspecific ST-T wave changes.  Overall normal ECG      Assessment & Plan     1. Abnormal ECG  EKG   2. Pre-operative cardiovascular examination  EKG       Shared Medical Decision Making:  ECG obtained on 8/21/2020 showed J-point ST elevation, however, ECG obtained today is normal.  He does not report any concerning cardiovascular  symptoms.  Patient's revised cardiac risk index for pre-operative risk is 3.9% for the risk of death, myocardial infarction or cardiac arrest.    This is been discussed in detail with patient who voices understanding.  All of his excellent questions were answered to the best of my knowledge and to his satisfaction.    It was a pleasure seeing Mr. Niranjan Richter in my clinic today. Return if symptoms worsen or fail to improve.. Patient is aware to call the cardiology clinic with any questions or concerns.      Shaggy Valle MD  [facility]  [facility]  [address] [address]  Phone: [phone]  Fax: [phone]

## 2020-10-02 ENCOUNTER — TELEPHONE (OUTPATIENT)
Dept: CARDIOLOGY | Facility: MEDICAL CENTER | Age: 45
End: 2020-10-02

## 2020-10-02 NOTE — TELEPHONE ENCOUNTER
Received cardiac clearance request form from [facility] for pt to proceed with orthopedic surgery L5-S1 ALIF. Pt was seen by Dr. Valle for preoperative surgical evaluation 9/18. This evaluation note faxed to [facility] at [phone].

## 2020-10-19 ENCOUNTER — PRE-ADMISSION TESTING (OUTPATIENT)
Dept: ADMISSIONS | Facility: MEDICAL CENTER | Age: 45
DRG: 460 | End: 2020-10-19
Attending: NEUROLOGICAL SURGERY
Payer: COMMERCIAL

## 2020-10-19 DIAGNOSIS — Z01.812 PRE-OPERATIVE LABORATORY EXAMINATION: Primary | ICD-10-CM

## 2020-10-19 LAB
25(OH)D3 SERPL-MCNC: 19 NG/ML (ref 30–100)
APPEARANCE UR: CLEAR
APTT PPP: 29.5 SEC (ref 24.7–36)
BILIRUB UR QL STRIP.AUTO: NEGATIVE
COLOR UR: YELLOW
COVID ORDER STATUS COVID19: NORMAL
ERYTHROCYTE [DISTWIDTH] IN BLOOD BY AUTOMATED COUNT: 46.3 FL (ref 35.9–50)
GLUCOSE UR STRIP.AUTO-MCNC: NEGATIVE MG/DL
HCT VFR BLD AUTO: 43.8 % (ref 42–52)
HGB BLD-MCNC: 14.6 G/DL (ref 14–18)
INR PPP: 1.09 (ref 0.87–1.13)
KETONES UR STRIP.AUTO-MCNC: NEGATIVE MG/DL
LEUKOCYTE ESTERASE UR QL STRIP.AUTO: NEGATIVE
MCH RBC QN AUTO: 33.7 PG (ref 27–33)
MCHC RBC AUTO-ENTMCNC: 33.3 G/DL (ref 33.7–35.3)
MCV RBC AUTO: 101.2 FL (ref 81.4–97.8)
MICRO URNS: NORMAL
NITRITE UR QL STRIP.AUTO: NEGATIVE
PH UR STRIP.AUTO: 7 [PH] (ref 5–8)
PLATELET # BLD AUTO: 282 K/UL (ref 164–446)
PMV BLD AUTO: 9 FL (ref 9–12.9)
PROT UR QL STRIP: NEGATIVE MG/DL
PROTHROMBIN TIME: 14.4 SEC (ref 12–14.6)
RBC # BLD AUTO: 4.33 M/UL (ref 4.7–6.1)
RBC UR QL AUTO: NEGATIVE
SARS-COV-2 RNA RESP QL NAA+PROBE: NOTDETECTED
SP GR UR STRIP.AUTO: 1.01
SPECIMEN SOURCE: NORMAL
UROBILINOGEN UR STRIP.AUTO-MCNC: 0.2 MG/DL
WBC # BLD AUTO: 4 K/UL (ref 4.8–10.8)

## 2020-10-19 PROCEDURE — U0003 INFECTIOUS AGENT DETECTION BY NUCLEIC ACID (DNA OR RNA); SEVERE ACUTE RESPIRATORY SYNDROME CORONAVIRUS 2 (SARS-COV-2) (CORONAVIRUS DISEASE [COVID-19]), AMPLIFIED PROBE TECHNIQUE, MAKING USE OF HIGH THROUGHPUT TECHNOLOGIES AS DESCRIBED BY CMS-2020-01-R: HCPCS

## 2020-10-19 PROCEDURE — 36415 COLL VENOUS BLD VENIPUNCTURE: CPT

## 2020-10-19 PROCEDURE — 85730 THROMBOPLASTIN TIME PARTIAL: CPT

## 2020-10-19 PROCEDURE — 81003 URINALYSIS AUTO W/O SCOPE: CPT

## 2020-10-19 PROCEDURE — 85610 PROTHROMBIN TIME: CPT

## 2020-10-19 PROCEDURE — C9803 HOPD COVID-19 SPEC COLLECT: HCPCS

## 2020-10-19 PROCEDURE — 82306 VITAMIN D 25 HYDROXY: CPT

## 2020-10-19 PROCEDURE — 85027 COMPLETE CBC AUTOMATED: CPT

## 2020-10-19 PROCEDURE — 80048 BASIC METABOLIC PNL TOTAL CA: CPT

## 2020-10-19 ASSESSMENT — FIBROSIS 4 INDEX: FIB4 SCORE: 0.64

## 2020-10-20 LAB
ANION GAP SERPL CALC-SCNC: 13 MMOL/L (ref 7–16)
BUN SERPL-MCNC: 14 MG/DL (ref 8–22)
CALCIUM SERPL-MCNC: 9.5 MG/DL (ref 8.5–10.5)
CHLORIDE SERPL-SCNC: 104 MMOL/L (ref 96–112)
CO2 SERPL-SCNC: 25 MMOL/L (ref 20–33)
CREAT SERPL-MCNC: 0.79 MG/DL (ref 0.5–1.4)
GLUCOSE SERPL-MCNC: 93 MG/DL (ref 65–99)
POTASSIUM SERPL-SCNC: 5 MMOL/L (ref 3.6–5.5)
SODIUM SERPL-SCNC: 142 MMOL/L (ref 135–145)

## 2020-10-21 ENCOUNTER — APPOINTMENT (OUTPATIENT)
Dept: RADIOLOGY | Facility: MEDICAL CENTER | Age: 45
DRG: 460 | End: 2020-10-21
Attending: NEUROLOGICAL SURGERY
Payer: COMMERCIAL

## 2020-10-21 ENCOUNTER — ANESTHESIA EVENT (OUTPATIENT)
Dept: SURGERY | Facility: MEDICAL CENTER | Age: 45
DRG: 460 | End: 2020-10-21
Payer: COMMERCIAL

## 2020-10-21 ENCOUNTER — ANESTHESIA (OUTPATIENT)
Dept: SURGERY | Facility: MEDICAL CENTER | Age: 45
DRG: 460 | End: 2020-10-21
Payer: COMMERCIAL

## 2020-10-21 ENCOUNTER — HOSPITAL ENCOUNTER (INPATIENT)
Facility: MEDICAL CENTER | Age: 45
LOS: 1 days | DRG: 460 | End: 2020-10-22
Attending: NEUROLOGICAL SURGERY | Admitting: NEUROLOGICAL SURGERY
Payer: COMMERCIAL

## 2020-10-21 DIAGNOSIS — G89.18 ACUTE POST-OPERATIVE PAIN: ICD-10-CM

## 2020-10-21 LAB
ABO GROUP BLD: NORMAL
BLD GP AB SCN SERPL QL: NORMAL
RH BLD: NORMAL

## 2020-10-21 PROCEDURE — 160042 HCHG SURGERY MINUTES - EA ADDL 1 MIN LEVEL 5: Performed by: NEUROLOGICAL SURGERY

## 2020-10-21 PROCEDURE — 700102 HCHG RX REV CODE 250 W/ 637 OVERRIDE(OP): Performed by: NEUROLOGICAL SURGERY

## 2020-10-21 PROCEDURE — 110454 HCHG SHELL REV 250: Performed by: NEUROLOGICAL SURGERY

## 2020-10-21 PROCEDURE — A9270 NON-COVERED ITEM OR SERVICE: HCPCS | Performed by: ANESTHESIOLOGY

## 2020-10-21 PROCEDURE — 700111 HCHG RX REV CODE 636 W/ 250 OVERRIDE (IP): Performed by: PHYSICIAN ASSISTANT

## 2020-10-21 PROCEDURE — A9270 NON-COVERED ITEM OR SERVICE: HCPCS | Performed by: NEUROLOGICAL SURGERY

## 2020-10-21 PROCEDURE — 700111 HCHG RX REV CODE 636 W/ 250 OVERRIDE (IP): Performed by: NEUROLOGICAL SURGERY

## 2020-10-21 PROCEDURE — L8699 PROSTHETIC IMPLANT NOS: HCPCS | Performed by: NEUROLOGICAL SURGERY

## 2020-10-21 PROCEDURE — 0SB40ZZ EXCISION OF LUMBOSACRAL DISC, OPEN APPROACH: ICD-10-PCS | Performed by: NEUROLOGICAL SURGERY

## 2020-10-21 PROCEDURE — A9270 NON-COVERED ITEM OR SERVICE: HCPCS | Performed by: PHYSICIAN ASSISTANT

## 2020-10-21 PROCEDURE — 700101 HCHG RX REV CODE 250: Performed by: PHYSICIAN ASSISTANT

## 2020-10-21 PROCEDURE — 700101 HCHG RX REV CODE 250: Performed by: ANESTHESIOLOGY

## 2020-10-21 PROCEDURE — 502000 HCHG MISC OR IMPLANTS RC 0278: Performed by: NEUROLOGICAL SURGERY

## 2020-10-21 PROCEDURE — 86901 BLOOD TYPING SEROLOGIC RH(D): CPT

## 2020-10-21 PROCEDURE — 700105 HCHG RX REV CODE 258: Performed by: NEUROLOGICAL SURGERY

## 2020-10-21 PROCEDURE — 700111 HCHG RX REV CODE 636 W/ 250 OVERRIDE (IP): Performed by: ANESTHESIOLOGY

## 2020-10-21 PROCEDURE — 770006 HCHG ROOM/CARE - MED/SURG/GYN SEMI*

## 2020-10-21 PROCEDURE — 0SG30A0 FUSION OF LUMBOSACRAL JOINT WITH INTERBODY FUSION DEVICE, ANTERIOR APPROACH, ANTERIOR COLUMN, OPEN APPROACH: ICD-10-PCS | Performed by: NEUROLOGICAL SURGERY

## 2020-10-21 PROCEDURE — 160009 HCHG ANES TIME/MIN: Performed by: NEUROLOGICAL SURGERY

## 2020-10-21 PROCEDURE — 160048 HCHG OR STATISTICAL LEVEL 1-5: Performed by: NEUROLOGICAL SURGERY

## 2020-10-21 PROCEDURE — 74018 RADEX ABDOMEN 1 VIEW: CPT

## 2020-10-21 PROCEDURE — 160035 HCHG PACU - 1ST 60 MINS PHASE I: Performed by: NEUROLOGICAL SURGERY

## 2020-10-21 PROCEDURE — 160002 HCHG RECOVERY MINUTES (STAT): Performed by: NEUROLOGICAL SURGERY

## 2020-10-21 PROCEDURE — 700106 HCHG RX REV CODE 271: Performed by: NEUROLOGICAL SURGERY

## 2020-10-21 PROCEDURE — 72100 X-RAY EXAM L-S SPINE 2/3 VWS: CPT

## 2020-10-21 PROCEDURE — 86850 RBC ANTIBODY SCREEN: CPT

## 2020-10-21 PROCEDURE — 700102 HCHG RX REV CODE 250 W/ 637 OVERRIDE(OP): Performed by: PHYSICIAN ASSISTANT

## 2020-10-21 PROCEDURE — 160031 HCHG SURGERY MINUTES - 1ST 30 MINS LEVEL 5: Performed by: NEUROLOGICAL SURGERY

## 2020-10-21 PROCEDURE — 160036 HCHG PACU - EA ADDL 30 MINS PHASE I: Performed by: NEUROLOGICAL SURGERY

## 2020-10-21 PROCEDURE — 700105 HCHG RX REV CODE 258: Performed by: ANESTHESIOLOGY

## 2020-10-21 PROCEDURE — 700102 HCHG RX REV CODE 250 W/ 637 OVERRIDE(OP): Performed by: ANESTHESIOLOGY

## 2020-10-21 PROCEDURE — 700101 HCHG RX REV CODE 250: Performed by: NEUROLOGICAL SURGERY

## 2020-10-21 PROCEDURE — 501838 HCHG SUTURE GENERAL: Performed by: NEUROLOGICAL SURGERY

## 2020-10-21 PROCEDURE — 86900 BLOOD TYPING SEROLOGIC ABO: CPT

## 2020-10-21 DEVICE — GRAPH BONE KIT INFUSE SMALL: Type: IMPLANTABLE DEVICE | Site: SPINE LUMBAR | Status: FUNCTIONAL

## 2020-10-21 DEVICE — IMPLANTABLE DEVICE: Type: IMPLANTABLE DEVICE | Site: SPINE LUMBAR | Status: FUNCTIONAL

## 2020-10-21 RX ORDER — ONDANSETRON 2 MG/ML
INJECTION INTRAMUSCULAR; INTRAVENOUS PRN
Status: DISCONTINUED | OUTPATIENT
Start: 2020-10-21 | End: 2020-10-21 | Stop reason: SURG

## 2020-10-21 RX ORDER — HYDROMORPHONE HYDROCHLORIDE 2 MG/ML
INJECTION, SOLUTION INTRAMUSCULAR; INTRAVENOUS; SUBCUTANEOUS PRN
Status: DISCONTINUED | OUTPATIENT
Start: 2020-10-21 | End: 2020-10-21 | Stop reason: SURG

## 2020-10-21 RX ORDER — DEXAMETHASONE SODIUM PHOSPHATE 4 MG/ML
INJECTION, SOLUTION INTRA-ARTICULAR; INTRALESIONAL; INTRAMUSCULAR; INTRAVENOUS; SOFT TISSUE PRN
Status: DISCONTINUED | OUTPATIENT
Start: 2020-10-21 | End: 2020-10-21 | Stop reason: SURG

## 2020-10-21 RX ORDER — OXYCODONE HYDROCHLORIDE AND ACETAMINOPHEN 5; 325 MG/1; MG/1
1 TABLET ORAL
Status: COMPLETED | OUTPATIENT
Start: 2020-10-21 | End: 2020-10-21

## 2020-10-21 RX ORDER — ALPRAZOLAM 0.25 MG/1
0.25 TABLET ORAL 2 TIMES DAILY PRN
Status: DISCONTINUED | OUTPATIENT
Start: 2020-10-21 | End: 2020-10-22 | Stop reason: HOSPADM

## 2020-10-21 RX ORDER — DOCUSATE SODIUM 100 MG/1
100 CAPSULE, LIQUID FILLED ORAL 2 TIMES DAILY
Status: DISCONTINUED | OUTPATIENT
Start: 2020-10-21 | End: 2020-10-22 | Stop reason: HOSPADM

## 2020-10-21 RX ORDER — OXYCODONE HYDROCHLORIDE AND ACETAMINOPHEN 5; 325 MG/1; MG/1
2 TABLET ORAL
Status: COMPLETED | OUTPATIENT
Start: 2020-10-21 | End: 2020-10-21

## 2020-10-21 RX ORDER — SODIUM CHLORIDE, SODIUM LACTATE, POTASSIUM CHLORIDE, AND CALCIUM CHLORIDE .6; .31; .03; .02 G/100ML; G/100ML; G/100ML; G/100ML
IRRIGANT IRRIGATION
Status: DISCONTINUED | OUTPATIENT
Start: 2020-10-21 | End: 2020-10-21 | Stop reason: HOSPADM

## 2020-10-21 RX ORDER — CEFAZOLIN SODIUM 1 G/3ML
INJECTION, POWDER, FOR SOLUTION INTRAMUSCULAR; INTRAVENOUS
Status: DISCONTINUED | OUTPATIENT
Start: 2020-10-21 | End: 2020-10-21 | Stop reason: HOSPADM

## 2020-10-21 RX ORDER — HYDROMORPHONE HYDROCHLORIDE 1 MG/ML
0.4 INJECTION, SOLUTION INTRAMUSCULAR; INTRAVENOUS; SUBCUTANEOUS
Status: DISCONTINUED | OUTPATIENT
Start: 2020-10-21 | End: 2020-10-21 | Stop reason: HOSPADM

## 2020-10-21 RX ORDER — LIDOCAINE HYDROCHLORIDE 20 MG/ML
INJECTION, SOLUTION EPIDURAL; INFILTRATION; INTRACAUDAL; PERINEURAL PRN
Status: DISCONTINUED | OUTPATIENT
Start: 2020-10-21 | End: 2020-10-21 | Stop reason: SURG

## 2020-10-21 RX ORDER — AMOXICILLIN 250 MG
1 CAPSULE ORAL
Status: DISCONTINUED | OUTPATIENT
Start: 2020-10-21 | End: 2020-10-22 | Stop reason: HOSPADM

## 2020-10-21 RX ORDER — SUCCINYLCHOLINE/SOD CL,ISO/PF 200MG/10ML
SYRINGE (ML) INTRAVENOUS PRN
Status: DISCONTINUED | OUTPATIENT
Start: 2020-10-21 | End: 2020-10-21 | Stop reason: SURG

## 2020-10-21 RX ORDER — SODIUM CHLORIDE, SODIUM LACTATE, POTASSIUM CHLORIDE, CALCIUM CHLORIDE 600; 310; 30; 20 MG/100ML; MG/100ML; MG/100ML; MG/100ML
INJECTION, SOLUTION INTRAVENOUS CONTINUOUS
Status: DISCONTINUED | OUTPATIENT
Start: 2020-10-21 | End: 2020-10-21 | Stop reason: HOSPADM

## 2020-10-21 RX ORDER — PROMETHAZINE HYDROCHLORIDE 25 MG/1
12.5-25 TABLET ORAL EVERY 4 HOURS PRN
Status: DISCONTINUED | OUTPATIENT
Start: 2020-10-21 | End: 2020-10-22 | Stop reason: HOSPADM

## 2020-10-21 RX ORDER — HYDROMORPHONE HYDROCHLORIDE 1 MG/ML
0.2 INJECTION, SOLUTION INTRAMUSCULAR; INTRAVENOUS; SUBCUTANEOUS
Status: DISCONTINUED | OUTPATIENT
Start: 2020-10-21 | End: 2020-10-21 | Stop reason: HOSPADM

## 2020-10-21 RX ORDER — PROCHLORPERAZINE EDISYLATE 5 MG/ML
5-10 INJECTION INTRAMUSCULAR; INTRAVENOUS EVERY 4 HOURS PRN
Status: DISCONTINUED | OUTPATIENT
Start: 2020-10-21 | End: 2020-10-22 | Stop reason: HOSPADM

## 2020-10-21 RX ORDER — ONDANSETRON 2 MG/ML
4 INJECTION INTRAMUSCULAR; INTRAVENOUS EVERY 4 HOURS PRN
Status: DISCONTINUED | OUTPATIENT
Start: 2020-10-21 | End: 2020-10-22 | Stop reason: HOSPADM

## 2020-10-21 RX ORDER — METHOCARBAMOL 750 MG/1
750 TABLET, FILM COATED ORAL EVERY 8 HOURS PRN
Status: DISCONTINUED | OUTPATIENT
Start: 2020-10-21 | End: 2020-10-22 | Stop reason: HOSPADM

## 2020-10-21 RX ORDER — ENEMA 19; 7 G/133ML; G/133ML
1 ENEMA RECTAL
Status: DISCONTINUED | OUTPATIENT
Start: 2020-10-21 | End: 2020-10-22 | Stop reason: HOSPADM

## 2020-10-21 RX ORDER — SODIUM CHLORIDE, SODIUM LACTATE, POTASSIUM CHLORIDE, CALCIUM CHLORIDE 600; 310; 30; 20 MG/100ML; MG/100ML; MG/100ML; MG/100ML
INJECTION, SOLUTION INTRAVENOUS CONTINUOUS
Status: ACTIVE | OUTPATIENT
Start: 2020-10-21 | End: 2020-10-21

## 2020-10-21 RX ORDER — HYDROMORPHONE HYDROCHLORIDE 1 MG/ML
0.1 INJECTION, SOLUTION INTRAMUSCULAR; INTRAVENOUS; SUBCUTANEOUS
Status: DISCONTINUED | OUTPATIENT
Start: 2020-10-21 | End: 2020-10-21 | Stop reason: HOSPADM

## 2020-10-21 RX ORDER — ONDANSETRON 2 MG/ML
4 INJECTION INTRAMUSCULAR; INTRAVENOUS
Status: DISCONTINUED | OUTPATIENT
Start: 2020-10-21 | End: 2020-10-21 | Stop reason: HOSPADM

## 2020-10-21 RX ORDER — MAGNESIUM HYDROXIDE 1200 MG/15ML
LIQUID ORAL
Status: COMPLETED | OUTPATIENT
Start: 2020-10-21 | End: 2020-10-21

## 2020-10-21 RX ORDER — PROMETHAZINE HYDROCHLORIDE 25 MG/1
12.5-25 SUPPOSITORY RECTAL EVERY 4 HOURS PRN
Status: DISCONTINUED | OUTPATIENT
Start: 2020-10-21 | End: 2020-10-22 | Stop reason: HOSPADM

## 2020-10-21 RX ORDER — DIPHENHYDRAMINE HYDROCHLORIDE 50 MG/ML
12.5 INJECTION INTRAMUSCULAR; INTRAVENOUS
Status: DISCONTINUED | OUTPATIENT
Start: 2020-10-21 | End: 2020-10-21 | Stop reason: HOSPADM

## 2020-10-21 RX ORDER — SODIUM CHLORIDE AND POTASSIUM CHLORIDE 150; 900 MG/100ML; MG/100ML
INJECTION, SOLUTION INTRAVENOUS CONTINUOUS
Status: DISCONTINUED | OUTPATIENT
Start: 2020-10-21 | End: 2020-10-22 | Stop reason: HOSPADM

## 2020-10-21 RX ORDER — CEFAZOLIN SODIUM 2 G/100ML
2 INJECTION, SOLUTION INTRAVENOUS EVERY 8 HOURS
Status: COMPLETED | OUTPATIENT
Start: 2020-10-21 | End: 2020-10-21

## 2020-10-21 RX ORDER — METOPROLOL TARTRATE 1 MG/ML
1 INJECTION, SOLUTION INTRAVENOUS
Status: DISCONTINUED | OUTPATIENT
Start: 2020-10-21 | End: 2020-10-21 | Stop reason: HOSPADM

## 2020-10-21 RX ORDER — BISACODYL 10 MG
10 SUPPOSITORY, RECTAL RECTAL
Status: DISCONTINUED | OUTPATIENT
Start: 2020-10-21 | End: 2020-10-22 | Stop reason: HOSPADM

## 2020-10-21 RX ORDER — POLYETHYLENE GLYCOL 3350 17 G/17G
1 POWDER, FOR SOLUTION ORAL 2 TIMES DAILY PRN
Status: DISCONTINUED | OUTPATIENT
Start: 2020-10-21 | End: 2020-10-22 | Stop reason: HOSPADM

## 2020-10-21 RX ORDER — MEPERIDINE HYDROCHLORIDE 25 MG/ML
12.5 INJECTION INTRAMUSCULAR; INTRAVENOUS; SUBCUTANEOUS
Status: DISCONTINUED | OUTPATIENT
Start: 2020-10-21 | End: 2020-10-21 | Stop reason: HOSPADM

## 2020-10-21 RX ORDER — CEFAZOLIN SODIUM 1 G/3ML
INJECTION, POWDER, FOR SOLUTION INTRAMUSCULAR; INTRAVENOUS PRN
Status: DISCONTINUED | OUTPATIENT
Start: 2020-10-21 | End: 2020-10-21 | Stop reason: SURG

## 2020-10-21 RX ORDER — MIDAZOLAM HYDROCHLORIDE 1 MG/ML
1 INJECTION INTRAMUSCULAR; INTRAVENOUS
Status: DISCONTINUED | OUTPATIENT
Start: 2020-10-21 | End: 2020-10-21 | Stop reason: HOSPADM

## 2020-10-21 RX ORDER — DIPHENHYDRAMINE HYDROCHLORIDE 50 MG/ML
25 INJECTION INTRAMUSCULAR; INTRAVENOUS EVERY 6 HOURS PRN
Status: DISCONTINUED | OUTPATIENT
Start: 2020-10-21 | End: 2020-10-22 | Stop reason: HOSPADM

## 2020-10-21 RX ORDER — HALOPERIDOL 5 MG/ML
1 INJECTION INTRAMUSCULAR
Status: DISCONTINUED | OUTPATIENT
Start: 2020-10-21 | End: 2020-10-21 | Stop reason: HOSPADM

## 2020-10-21 RX ORDER — MIDAZOLAM HYDROCHLORIDE 1 MG/ML
INJECTION INTRAMUSCULAR; INTRAVENOUS PRN
Status: DISCONTINUED | OUTPATIENT
Start: 2020-10-21 | End: 2020-10-21 | Stop reason: SURG

## 2020-10-21 RX ORDER — AMOXICILLIN 250 MG
1 CAPSULE ORAL NIGHTLY
Status: DISCONTINUED | OUTPATIENT
Start: 2020-10-21 | End: 2020-10-22 | Stop reason: HOSPADM

## 2020-10-21 RX ORDER — DIPHENHYDRAMINE HCL 25 MG
25 TABLET ORAL EVERY 6 HOURS PRN
Status: DISCONTINUED | OUTPATIENT
Start: 2020-10-21 | End: 2020-10-22 | Stop reason: HOSPADM

## 2020-10-21 RX ORDER — LABETALOL HYDROCHLORIDE 5 MG/ML
5 INJECTION, SOLUTION INTRAVENOUS
Status: DISCONTINUED | OUTPATIENT
Start: 2020-10-21 | End: 2020-10-21 | Stop reason: HOSPADM

## 2020-10-21 RX ORDER — ONDANSETRON 4 MG/1
4 TABLET, ORALLY DISINTEGRATING ORAL EVERY 4 HOURS PRN
Status: DISCONTINUED | OUTPATIENT
Start: 2020-10-21 | End: 2020-10-22 | Stop reason: HOSPADM

## 2020-10-21 RX ORDER — VITAMIN B COMPLEX
5000 TABLET ORAL DAILY
Status: DISCONTINUED | OUTPATIENT
Start: 2020-10-21 | End: 2020-10-22 | Stop reason: HOSPADM

## 2020-10-21 RX ORDER — BUPIVACAINE HYDROCHLORIDE AND EPINEPHRINE 5; 5 MG/ML; UG/ML
INJECTION, SOLUTION EPIDURAL; INTRACAUDAL; PERINEURAL
Status: DISCONTINUED | OUTPATIENT
Start: 2020-10-21 | End: 2020-10-21 | Stop reason: HOSPADM

## 2020-10-21 RX ORDER — LABETALOL HYDROCHLORIDE 5 MG/ML
10 INJECTION, SOLUTION INTRAVENOUS
Status: DISCONTINUED | OUTPATIENT
Start: 2020-10-21 | End: 2020-10-22 | Stop reason: HOSPADM

## 2020-10-21 RX ORDER — ROCURONIUM BROMIDE 10 MG/ML
INJECTION, SOLUTION INTRAVENOUS PRN
Status: DISCONTINUED | OUTPATIENT
Start: 2020-10-21 | End: 2020-10-21 | Stop reason: SURG

## 2020-10-21 RX ADMIN — DEXAMETHASONE SODIUM PHOSPHATE 4 MG: 4 INJECTION, SOLUTION INTRA-ARTICULAR; INTRALESIONAL; INTRAMUSCULAR; INTRAVENOUS; SOFT TISSUE at 07:03

## 2020-10-21 RX ADMIN — POTASSIUM CHLORIDE AND SODIUM CHLORIDE: 900; 150 INJECTION, SOLUTION INTRAVENOUS at 16:01

## 2020-10-21 RX ADMIN — LIDOCAINE HYDROCHLORIDE 0.5 ML: 10 INJECTION, SOLUTION EPIDURAL; INFILTRATION; INTRACAUDAL at 06:05

## 2020-10-21 RX ADMIN — ROCURONIUM BROMIDE 50 MG: 10 INJECTION, SOLUTION INTRAVENOUS at 07:03

## 2020-10-21 RX ADMIN — DOCUSATE SODIUM 100 MG: 100 CAPSULE ORAL at 16:06

## 2020-10-21 RX ADMIN — HYDROMORPHONE HYDROCHLORIDE 0.4 MG: 1 INJECTION, SOLUTION INTRAMUSCULAR; INTRAVENOUS; SUBCUTANEOUS at 09:25

## 2020-10-21 RX ADMIN — EPHEDRINE SULFATE 25 MG: 50 INJECTION, SOLUTION INTRAVENOUS at 07:03

## 2020-10-21 RX ADMIN — Medication: at 16:11

## 2020-10-21 RX ADMIN — PROPOFOL 200 MG: 10 INJECTION, EMULSION INTRAVENOUS at 07:03

## 2020-10-21 RX ADMIN — ROCURONIUM BROMIDE 20 MG: 10 INJECTION, SOLUTION INTRAVENOUS at 07:46

## 2020-10-21 RX ADMIN — SUGAMMADEX 200 MG: 100 INJECTION, SOLUTION INTRAVENOUS at 08:04

## 2020-10-21 RX ADMIN — FENTANYL CITRATE 50 MCG: 50 INJECTION, SOLUTION INTRAMUSCULAR; INTRAVENOUS at 08:33

## 2020-10-21 RX ADMIN — LIDOCAINE HYDROCHLORIDE 50 MG: 20 INJECTION, SOLUTION EPIDURAL; INFILTRATION; INTRACAUDAL at 07:03

## 2020-10-21 RX ADMIN — DOCUSATE SODIUM 50 MG AND SENNOSIDES 8.6 MG 1 TABLET: 8.6; 5 TABLET, FILM COATED ORAL at 20:42

## 2020-10-21 RX ADMIN — MIDAZOLAM HYDROCHLORIDE 2 MG: 1 INJECTION, SOLUTION INTRAMUSCULAR; INTRAVENOUS at 07:03

## 2020-10-21 RX ADMIN — POVIDONE IODINE 15 ML: 100 SOLUTION TOPICAL at 06:05

## 2020-10-21 RX ADMIN — HYDROMORPHONE HYDROCHLORIDE 0.4 MG: 1 INJECTION, SOLUTION INTRAMUSCULAR; INTRAVENOUS; SUBCUTANEOUS at 09:18

## 2020-10-21 RX ADMIN — CEFAZOLIN SODIUM 2 G: 2 INJECTION, SOLUTION INTRAVENOUS at 23:23

## 2020-10-21 RX ADMIN — CEFAZOLIN 2 G: 330 INJECTION, POWDER, FOR SOLUTION INTRAMUSCULAR; INTRAVENOUS at 07:03

## 2020-10-21 RX ADMIN — HYDROMORPHONE HYDROCHLORIDE 2 MG: 2 INJECTION, SOLUTION INTRAMUSCULAR; INTRAVENOUS; SUBCUTANEOUS at 07:49

## 2020-10-21 RX ADMIN — Medication 5000 UNITS: at 16:06

## 2020-10-21 RX ADMIN — ONDANSETRON 4 MG: 2 INJECTION INTRAMUSCULAR; INTRAVENOUS at 07:03

## 2020-10-21 RX ADMIN — SODIUM CHLORIDE, POTASSIUM CHLORIDE, SODIUM LACTATE AND CALCIUM CHLORIDE: 600; 310; 30; 20 INJECTION, SOLUTION INTRAVENOUS at 08:38

## 2020-10-21 RX ADMIN — FENTANYL CITRATE 50 MCG: 50 INJECTION, SOLUTION INTRAMUSCULAR; INTRAVENOUS at 08:49

## 2020-10-21 RX ADMIN — FENTANYL CITRATE 250 MCG: 50 INJECTION, SOLUTION INTRAMUSCULAR; INTRAVENOUS at 07:03

## 2020-10-21 RX ADMIN — HYDROMORPHONE HYDROCHLORIDE 0.2 MG: 1 INJECTION, SOLUTION INTRAMUSCULAR; INTRAVENOUS; SUBCUTANEOUS at 09:30

## 2020-10-21 RX ADMIN — CEFAZOLIN SODIUM 2 G: 2 INJECTION, SOLUTION INTRAVENOUS at 16:04

## 2020-10-21 RX ADMIN — OXYCODONE HYDROCHLORIDE AND ACETAMINOPHEN 2 TABLET: 5; 325 TABLET ORAL at 08:32

## 2020-10-21 RX ADMIN — Medication 100 MG: at 07:03

## 2020-10-21 RX ADMIN — SODIUM CHLORIDE, POTASSIUM CHLORIDE, SODIUM LACTATE AND CALCIUM CHLORIDE: 600; 310; 30; 20 INJECTION, SOLUTION INTRAVENOUS at 06:06

## 2020-10-21 ASSESSMENT — LIFESTYLE VARIABLES
ON A TYPICAL DAY WHEN YOU DRINK ALCOHOL HOW MANY DRINKS DO YOU HAVE: 3
TOTAL SCORE: 0
TOTAL SCORE: 0
AVERAGE NUMBER OF DAYS PER WEEK YOU HAVE A DRINK CONTAINING ALCOHOL: 4
CONSUMPTION TOTAL: NEGATIVE
TOTAL SCORE: 0
EVER FELT BAD OR GUILTY ABOUT YOUR DRINKING: NO
ALCOHOL_USE: YES
DOES PATIENT WANT TO STOP DRINKING: NO
HAVE PEOPLE ANNOYED YOU BY CRITICIZING YOUR DRINKING: NO
EVER HAD A DRINK FIRST THING IN THE MORNING TO STEADY YOUR NERVES TO GET RID OF A HANGOVER: NO
HOW MANY TIMES IN THE PAST YEAR HAVE YOU HAD 5 OR MORE DRINKS IN A DAY: 0
HAVE YOU EVER FELT YOU SHOULD CUT DOWN ON YOUR DRINKING: NO

## 2020-10-21 ASSESSMENT — PAIN DESCRIPTION - PAIN TYPE
TYPE: CHRONIC PAIN;SURGICAL PAIN
TYPE: SURGICAL PAIN
TYPE: CHRONIC PAIN;SURGICAL PAIN
TYPE: CHRONIC PAIN;SURGICAL PAIN
TYPE: SURGICAL PAIN
TYPE: CHRONIC PAIN;SURGICAL PAIN
TYPE: SURGICAL PAIN
TYPE: CHRONIC PAIN;SURGICAL PAIN

## 2020-10-21 ASSESSMENT — COGNITIVE AND FUNCTIONAL STATUS - GENERAL
HELP NEEDED FOR BATHING: A LITTLE
CLIMB 3 TO 5 STEPS WITH RAILING: A LITTLE
DRESSING REGULAR LOWER BODY CLOTHING: A LITTLE
STANDING UP FROM CHAIR USING ARMS: A LITTLE
DAILY ACTIVITIY SCORE: 21
TOILETING: A LITTLE
WALKING IN HOSPITAL ROOM: A LITTLE
SUGGESTED CMS G CODE MODIFIER MOBILITY: CJ
SUGGESTED CMS G CODE MODIFIER DAILY ACTIVITY: CJ
MOBILITY SCORE: 21

## 2020-10-21 ASSESSMENT — PATIENT HEALTH QUESTIONNAIRE - PHQ9
1. LITTLE INTEREST OR PLEASURE IN DOING THINGS: NOT AT ALL
SUM OF ALL RESPONSES TO PHQ9 QUESTIONS 1 AND 2: 0
2. FEELING DOWN, DEPRESSED, IRRITABLE, OR HOPELESS: NOT AT ALL

## 2020-10-21 ASSESSMENT — FIBROSIS 4 INDEX: FIB4 SCORE: 0.64

## 2020-10-21 ASSESSMENT — PAIN SCALES - GENERAL: PAIN_LEVEL: 3

## 2020-10-21 NOTE — CARE PLAN
Problem: Pain Management  Goal: Pain level will decrease to patient's comfort goal  Outcome: PROGRESSING AS EXPECTED   Pt educated on 0-10 pain scale. Pt educated to report any need for pain medication as needed.  Problem: Safety  Goal: Will remain free from injury  Outcome: PROGRESSING AS EXPECTED   Pt educated to call for assistance before getting up. Bed locked and in lowest position. Personal belongings and call light within reach.  Problem: Infection  Goal: Will remain free from infection  Outcome: PROGRESSING AS EXPECTED   Pt educated on Incentive spirometer. Pt evaluated using teach back method. Pt demonstrated correct use of incentive spirometer and verbalizes understanding.

## 2020-10-21 NOTE — OP REPORT
1. DATE OF SURGERY: 10.21.20    2. SURGEON:  Vandana Saavedra MD, PhD, DAVID, Neurosurgeon    3. COSURGEON:  Govind Mccall MD, FACS    4. TYPE OF ANESTHESIA:  General anesthesia with endotracheal intubation    5. PREOPERATIVE DIAGNOSIS: See formal admission H and P    6. POSTOPERATIVE DIAGNOSIS:  Lumbar spinal stenosis    7. HISTORY: See formal admission H and P    This is a 46-year-old man.  He has chronic back and left leg symptoms.  He has mechanical back pain.  He has muscle spasms.  His back pain is 5 out of 10 when he sitting is worse when he stands.  There is left posterior thigh pain with numbness.  We tried all the conservative measures and I did not work.  He had a reduced range of motion lumbar spine with some patchy numbness in the lower extremities.  He had a degenerative disc with bilateral foraminal stenosis.  We consequently offered him surgery.  He also has known thoracic outlet syndrome and had surgery for this.    8. PREOPERATIVE PHYSICAL EXAMINATION: See formal admission H and P    9. TITLE OF THE PROCEDURE:  • L5-S1 anterior discectomy using a left-sided retroperitoneal approach with adjacent partial corpectomies and >30% of total adjacent bone removed from endplates)  • L5-S1 interbody fusion using the Medtronic/Titan Spine interbody cage with Infuse BMP.  • L5-S1 anterior spinal fixation using an anterior locking screws  • Microscopic magnification.  • Fluoroscopic guidance for placement of screws.    10. OPERATIVE FINDINGS:  Degenerative disc and stenosis as outlined.    11. OPERATED LEVELS: L5-S1      12. IMPLANTS USED:  Medtronic/Titan Spine Titanium interbody cage  Infuse BMP  Medtronic/Titan Spine Titanium interbody cage anterior lumbar screws    13. COMPLICATIONS:  Nil.    14. ESTIMATED BLOOD LOSS:       10 mL    15. OPERATIVE DETAILS:  After fully informed consent, the patient was brought to the operating room.  General anesthesia was administered.  A Archuleta catheter was placed.   The patient was positioned supine on a flat OSI operating table with the arms extended by the side.  All pressure points were padded. The abdomen was shaved, prepped, and draped in a standard fashion.  Dr. Mccall localized the skin incision and performed a transverse incision over the infected level(s).  I refer you to his details for the exposure.      In general terms he performed the skin incision and then a left-sided retroperitoneal exposure.  He mobilized the great vessels avoiding the ureter and exposed the affected disk space.  At L4/5, if needed for exposure,  if the iliolumbar veins needed ligation, he ligated them.  He placed self-retraining retractors. Please refer to his dictation for details. Once the disk space was exposed, the neurosurgical portion commenced.    I performed an AP and lateral x-ray for conformation of the appropriate level. Then, under microscopic magnification, using a #15 blade on a long handle I made a midline H-shaped annulotomy of the disc space to be operated upon. Using a Choudhary curette, I then performed a subperiosteal discectomy.  Using various curettes, luis and rangers, I performed a complete discectomy.  Great care was taken at the level of the posterior annulus being careful not to violate into the epidural space.  Lateral fluoroscopy was used to ensure that the epidural space was not entered. A Z-pasty was also performed posteriorly and laterally.      Under microscopic magnification, using an AM-8 drill bit, I performed a partial corpectomy of the adjacent end plates with a total of >30% bony removal.  The end plates were also decorticated using a rasp.  I confirmed the degree of decompression on lateral fluoroscopy.    The microscope was taken out of the field of view.  With fluoroscopic guidance, I then used the titanium cage trials to size for the the appropriate cage.  Once I knew the footprint size, height and angle, the titanium cage was packed with infuse BMP  and then impacted into the interspace using fluoroscopic guidance.  This is 11 mm high in his large cage.    I then placed 3 screws into the superior and inferior vertebral bodies using fluoroscopic guidance after awling initial entry points and trajectories.  The screws were all 6 mm diameter.  Is a 35 mm screws.  Hemostasis was obtained.  Dr. Mccall then performed the closure.  I refer to his notes for further details.  Final AP and lateral x-ray was taken to confirm satisfactory placement of the instrumentation and appropriate reduction/distraction. All counts were correct and all instruments accounted for.    If more than one level was operated upon, Dr. Mccall then removed the retractors and exposed the next level and similar decompression and instrumentation was effected at the next level with final x-rays confirming satisfactory placement of hardware.       16. PROGNOSIS:  The surgery went well.  The patient was moving both lower extremities well at the end of the case.  We will see how things progress. We will commence lovenox tomorrow and anticipate discharge in 24-48 hours. When the patient goes home, he/she will be discharged home on narcotic analgesia, Flexural and oral antibiotic, usually Keflex.  The plan will be to follow up in 2 weeks in the office.  We will call the patient next week to ensure there are not any problems.  He/she can shower in 72 hours but is instructed to keep the wound dry.  The patient has also been instructed to abstain from smoking or any anti-inflammatories. The patient will need to wear an LSO brace when out of bed.      Vandana Whitfield MD, PhD, FRACS, FACS, FAANS

## 2020-10-21 NOTE — PROGRESS NOTES
Patient received from PACU, discharge education started.  Using IS pulls 3000ml, await pharmacy to approve medications before starting.  SCDs applied, skin check complete.  Surgical dressing clean with pink bloody drainage under tegaderm.  Archuleta to gravity drainage with stat lock.  No additional needs at this time.  Pain so far controlled will have morphine demand PCA patient was educated at bedside.

## 2020-10-21 NOTE — ANESTHESIA TIME REPORT
Anesthesia Start and Stop Event Times     Date Time Event    10/21/2020 0644 Ready for Procedure     0702 Anesthesia Start     0822 Anesthesia Stop        Responsible Staff  10/21/20    Name Role Begin End    Real Phipps M.D. Anesth 0702 0822        Preop Diagnosis (Free Text):  Pre-op Diagnosis     STENOSIS LUMBAR SPINE        Preop Diagnosis (Codes):    Post op Diagnosis  Lumbar stenosis      Premium Reason  Non-Premium    Comments:

## 2020-10-21 NOTE — ANESTHESIA POSTPROCEDURE EVALUATION
Patient: Niranjan Richter    Procedure Summary     Date: 10/21/20 Room / Location: [place] [address] / SURGERY [facility]    Anesthesia Start: 0702 Anesthesia Stop:     Procedure: FUSION, SPINE, LUMBAR, ANTERIOR APPROACH- L5-S1 ALIF AND INSTRUMENTED (Spine Lumbar) Diagnosis: (STENOSIS LUMBAR SPINE)    Surgeon: Vandana Whitfield M.D. Responsible Provider: Real Phipps M.D.    Anesthesia Type: general ASA Status: 2          Final Anesthesia Type: general  Last vitals  BP   NIBP: 143/94    Temp   37 °C (98.6 °F)    Pulse   Pulse: 70   Resp   14    SpO2   99 %      Anesthesia Post Evaluation    Patient location during evaluation: PACU  Patient participation: complete - patient participated  Level of consciousness: awake and alert  Pain score: 3    Airway patency: patent  Anesthetic complications: no  Cardiovascular status: hemodynamically stable  Respiratory status: acceptable and nasal cannula  Hydration status: euvolemic    PONV: none           Nurse Pain Score: 7 (NPRS)

## 2020-10-21 NOTE — PROGRESS NOTES
2 RN Skin Check    2 RN skin check complete with Tamir  Devices in place: SCDs, teds, soto  Skin assessed under devices: yes.  Confirmed pressure ulcers found on: none.  New potential pressure ulcers noted on non. Wound consult placed No.  The following interventions in place Pillows, pt turns independently in bed, pt educated on skin breakdown prevention, stat lock for soto.    · Dressing to abd with small bloody drainage and intact  · B/l elbows, sacrum, behind ears, b/l heels-pink and blanching

## 2020-10-22 VITALS
HEART RATE: 60 BPM | WEIGHT: 155.87 LBS | RESPIRATION RATE: 17 BRPM | TEMPERATURE: 98 F | SYSTOLIC BLOOD PRESSURE: 148 MMHG | OXYGEN SATURATION: 98 % | BODY MASS INDEX: 21.82 KG/M2 | DIASTOLIC BLOOD PRESSURE: 86 MMHG | HEIGHT: 71 IN

## 2020-10-22 LAB
ANION GAP SERPL CALC-SCNC: 8 MMOL/L (ref 7–16)
BUN SERPL-MCNC: 11 MG/DL (ref 8–22)
CALCIUM SERPL-MCNC: 8.6 MG/DL (ref 8.5–10.5)
CHLORIDE SERPL-SCNC: 102 MMOL/L (ref 96–112)
CO2 SERPL-SCNC: 26 MMOL/L (ref 20–33)
CREAT SERPL-MCNC: 0.68 MG/DL (ref 0.5–1.4)
ERYTHROCYTE [DISTWIDTH] IN BLOOD BY AUTOMATED COUNT: 46.5 FL (ref 35.9–50)
GLUCOSE SERPL-MCNC: 114 MG/DL (ref 65–99)
HCT VFR BLD AUTO: 38.4 % (ref 42–52)
HGB BLD-MCNC: 13.1 G/DL (ref 14–18)
MCH RBC QN AUTO: 34.5 PG (ref 27–33)
MCHC RBC AUTO-ENTMCNC: 34.1 G/DL (ref 33.7–35.3)
MCV RBC AUTO: 101.1 FL (ref 81.4–97.8)
PLATELET # BLD AUTO: 235 K/UL (ref 164–446)
PMV BLD AUTO: 8.9 FL (ref 9–12.9)
POTASSIUM SERPL-SCNC: 4 MMOL/L (ref 3.6–5.5)
RBC # BLD AUTO: 3.8 M/UL (ref 4.7–6.1)
SODIUM SERPL-SCNC: 136 MMOL/L (ref 135–145)
WBC # BLD AUTO: 8.6 K/UL (ref 4.8–10.8)

## 2020-10-22 PROCEDURE — A9270 NON-COVERED ITEM OR SERVICE: HCPCS | Performed by: PHYSICIAN ASSISTANT

## 2020-10-22 PROCEDURE — 80048 BASIC METABOLIC PNL TOTAL CA: CPT

## 2020-10-22 PROCEDURE — 97165 OT EVAL LOW COMPLEX 30 MIN: CPT

## 2020-10-22 PROCEDURE — 700111 HCHG RX REV CODE 636 W/ 250 OVERRIDE (IP): Performed by: PHYSICIAN ASSISTANT

## 2020-10-22 PROCEDURE — 85027 COMPLETE CBC AUTOMATED: CPT

## 2020-10-22 PROCEDURE — 97161 PT EVAL LOW COMPLEX 20 MIN: CPT

## 2020-10-22 PROCEDURE — 36415 COLL VENOUS BLD VENIPUNCTURE: CPT

## 2020-10-22 PROCEDURE — 700102 HCHG RX REV CODE 250 W/ 637 OVERRIDE(OP): Performed by: PHYSICIAN ASSISTANT

## 2020-10-22 PROCEDURE — 700101 HCHG RX REV CODE 250: Performed by: PHYSICIAN ASSISTANT

## 2020-10-22 RX ORDER — METHOCARBAMOL 750 MG/1
750 TABLET, FILM COATED ORAL EVERY 8 HOURS PRN
Qty: 90 TAB | Refills: 0 | Status: SHIPPED | OUTPATIENT
Start: 2020-10-22

## 2020-10-22 RX ORDER — OXYCODONE HYDROCHLORIDE AND ACETAMINOPHEN 5; 325 MG/1; MG/1
1-2 TABLET ORAL EVERY 4 HOURS PRN
Status: DISCONTINUED | OUTPATIENT
Start: 2020-10-22 | End: 2020-10-22 | Stop reason: HOSPADM

## 2020-10-22 RX ORDER — CEPHALEXIN 500 MG/1
500 CAPSULE ORAL 4 TIMES DAILY
Qty: 20 CAP | Refills: 0 | Status: SHIPPED | OUTPATIENT
Start: 2020-10-22

## 2020-10-22 RX ORDER — OXYCODONE HYDROCHLORIDE AND ACETAMINOPHEN 5; 325 MG/1; MG/1
1 TABLET ORAL EVERY 4 HOURS PRN
Qty: 30 TAB | Refills: 0 | Status: SHIPPED | OUTPATIENT
Start: 2020-10-22 | End: 2020-10-27

## 2020-10-22 RX ADMIN — DOCUSATE SODIUM 100 MG: 100 CAPSULE ORAL at 06:14

## 2020-10-22 RX ADMIN — ENOXAPARIN SODIUM 40 MG: 40 INJECTION SUBCUTANEOUS at 08:28

## 2020-10-22 RX ADMIN — OXYCODONE HYDROCHLORIDE AND ACETAMINOPHEN 1 TABLET: 5; 325 TABLET ORAL at 11:35

## 2020-10-22 RX ADMIN — Medication 5000 UNITS: at 06:14

## 2020-10-22 RX ADMIN — POTASSIUM CHLORIDE AND SODIUM CHLORIDE: 900; 150 INJECTION, SOLUTION INTRAVENOUS at 03:20

## 2020-10-22 ASSESSMENT — COGNITIVE AND FUNCTIONAL STATUS - GENERAL
SUGGESTED CMS G CODE MODIFIER MOBILITY: CJ
DAILY ACTIVITIY SCORE: 23
MOBILITY SCORE: 22
HELP NEEDED FOR BATHING: A LITTLE
SUGGESTED CMS G CODE MODIFIER DAILY ACTIVITY: CI
MOVING FROM LYING ON BACK TO SITTING ON SIDE OF FLAT BED: A LITTLE
MOVING TO AND FROM BED TO CHAIR: A LITTLE

## 2020-10-22 ASSESSMENT — ACTIVITIES OF DAILY LIVING (ADL): TOILETING: INDEPENDENT

## 2020-10-22 ASSESSMENT — GAIT ASSESSMENTS
DISTANCE (FEET): 150
GAIT LEVEL OF ASSIST: SUPERVISED
DEVIATION: BRADYKINETIC;DECREASED BASE OF SUPPORT;ANTALGIC
ASSISTIVE DEVICE: FRONT WHEEL WALKER

## 2020-10-22 ASSESSMENT — PAIN DESCRIPTION - PAIN TYPE
TYPE: SURGICAL PAIN

## 2020-10-22 NOTE — PROGRESS NOTES
Neurosurgery Progress Note    Subjective:  POD #1 seen with Dr. Whitfield  Some surgical site pain this am  LE symptoms improved  Mobilizing in room  Archuleta placed    Exam:  Wound: C/d/I  LE motor 5/5 throughout bilteraly  Labs stable  VSS    BP  Min: 120/70  Max: 149/80  Pulse  Av.7  Min: 61  Max: 92  Resp  Av.2  Min: 12  Max: 23  Temp  Av.6 °C (97.9 °F)  Min: 36.3 °C (97.3 °F)  Max: 37.1 °C (98.8 °F)  SpO2  Av.5 %  Min: 93 %  Max: 100 %    No data recorded    Recent Labs     10/19/20  0838 10/22/20  0022   WBC 4.0* 8.6   RBC 4.33* 3.80*   HEMOGLOBIN 14.6 13.1*   HEMATOCRIT 43.8 38.4*   .2* 101.1*   MCH 33.7* 34.5*   MCHC 33.3* 34.1   RDW 46.3 46.5   PLATELETCT 282 235   MPV 9.0 8.9*     Recent Labs     10/19/20  0838 10/22/20  0022   SODIUM 142 136   POTASSIUM 5.0 4.0   CHLORIDE 104 102   CO2 25 26   GLUCOSE 93 114*   BUN 14 11   CREATININE 0.79 0.68   CALCIUM 9.5 8.6     Recent Labs     10/19/20  0838   APTT 29.5   INR 1.09           Intake/Output       10/21/20 0700 - 10/22/20 0659 10/22/20 0700 - 10/23/20 0659      1900-0659 Total 9591-0425 1838-3593 Total       Intake    P.O.  1010  -- 1010  --  -- --    P.O. 1010 -- 1010 -- -- --    I.V.  906.7  -- 906.7  --  -- --    Volume (mL) (lactated ringers infusion) 750 -- 750 -- -- --    Volume (mL) (0.9 % NaCl with KCl 20 mEq infusion) 156.7 -- 156.7 -- -- --    Total Intake 1916.7 -- 1916.7 -- -- --       Output    Urine  1500  1850 3350  --  -- --    Urine 300 -- 300 -- -- --    Number of Times Voided 1 x -- 1 x -- -- --    Output (mL) (Urethral Catheter Latex 16 Fr.) 1200 1850 3050 -- -- --    Emesis  0  -- 0  --  -- --    Emesis 0 -- 0 -- -- --    Stool  --  -- --  --  -- --    Number of Times Stooled 0 x -- 0 x -- -- --    Blood  50  -- 50  --  -- --    Est. Blood Loss 50 -- 50 -- -- --    Total Output 1550 1850 3400 -- -- --       Net I/O     366.7 -1850 -1483.3 -- -- --            Intake/Output Summary (Last 24 hours) at  10/22/2020 0802  Last data filed at 10/22/2020 0000  Gross per 24 hour   Intake 1916.67 ml   Output 3400 ml   Net -1483.33 ml            • Pharmacy Consult Request  1 Each PHARMACY TO DOSE   • MD ALERT...DO NOT ADMINISTER NSAIDS or ASPIRIN unless ORDERED By Neurosurgery  1 Each PRN   • docusate sodium  100 mg BID   • senna-docusate  1 Tab Nightly   • senna-docusate  1 Tab Q24HRS PRN   • polyethylene glycol/lytes  1 Packet BID PRN   • magnesium hydroxide  30 mL QDAY PRN   • bisacodyl  10 mg Q24HRS PRN   • fleet  1 Each Once PRN   • 0.9 % NaCl with KCl 20 mEq 1,000 mL   Continuous   • enoxaparin  40 mg DAILY AT 1800   • morphine   Continuous   • diphenhydrAMINE  25 mg Q6HRS PRN    Or   • diphenhydrAMINE  25 mg Q6HRS PRN   • ondansetron  4 mg Q4HRS PRN   • ondansetron  4 mg Q4HRS PRN   • promethazine  12.5-25 mg Q4HRS PRN   • promethazine  12.5-25 mg Q4HRS PRN   • prochlorperazine  5-10 mg Q4HRS PRN   • methocarbamol  750 mg Q8HRS PRN   • ALPRAZolam  0.25 mg BID PRN   • labetalol  10 mg Q HOUR PRN   • benzocaine-menthol  1 Lozenge Q2HRS PRN   • vitamin D  5,000 Units DAILY       Assessment and Plan:  Hospital day #2  POD #1  Prophylactic anticoagulation: no    Plan:  1. D/c [facility]  2. Mobilize with PT/OT  3. D/C PCA-Start orals  4. D/c home today at 1400hrs

## 2020-10-22 NOTE — CARE PLAN
Problem: Safety  Goal: Will remain free from injury  Outcome: PROGRESSING AS EXPECTED  Note: Safety precaution in effect. Non skid socks in use. Call light within reach. Reminded patient to call for assist. Hourly rounds in effect. Verbalized understanding.     Problem: Infection  Goal: Will remain free from infection  Outcome: PROGRESSING AS EXPECTED  Note: Implement Standard precaution. Hand washing every encounter & before & after patient care. Verbalized understanding.     Problem: Knowledge Deficit  Goal: Knowledge of disease process/condition, treatment plan, diagnostic tests, and medications will improve  Outcome: PROGRESSING AS EXPECTED  Note: Discussed Plan of care. PT & OT Eval. Discharge instructions.. Questions answered. Verbalized understanding.      Problem: Pain Management  Goal: Pain level will decrease to patient's comfort goal  Outcome: PROGRESSING AS EXPECTED  Note: Educated on pain scale. Encouraged to Verbalize pain. Will medicate as per MAR.

## 2020-10-22 NOTE — DISCHARGE INSTRUCTIONS
Acetaminophen; Oxycodone tablets  What is this medicine?  ACETAMINOPHEN; OXYCODONE (a set a BRIE uriel fen; ox i KOE done) is a pain reliever. It is used to treat moderate to severe pain.  This medicine may be used for other purposes; ask your health care provider or pharmacist if you have questions.  COMMON BRAND NAME(S): Endocet, Magnacet, Nalocet, Narvox, Percocet, Perloxx, Primalev, Primlev, Roxicet, Xolox  What should I tell my health care provider before I take this medicine?  They need to know if you have any of these conditions:  · brain tumor  · Crohn's disease, inflammatory bowel disease, or ulcerative colitis  · drug abuse or addiction  · head injury  · heart or circulation problems  · if you often drink alcohol  · kidney disease or problems going to the bathroom  · liver disease  · lung disease, asthma, or breathing problems  · an unusual or allergic reaction to acetaminophen, oxycodone, other opioid analgesics, other medicines, foods, dyes, or preservatives  · pregnant or trying to get pregnant  · breast-feeding  How should I use this medicine?  Take this medicine by mouth with a full glass of water. Follow the directions on the prescription label. You can take it with or without food. If it upsets your stomach, take it with food. Take your medicine at regular intervals. Do not take it more often than directed.  A special MedGuide will be given to you by the pharmacist with each prescription and refill. Be sure to read this information carefully each time.  Talk to your pediatrician regarding the use of this medicine in children. Special care may be needed.  Overdosage: If you think you have taken too much of this medicine contact a poison control center or emergency room at once.  NOTE: This medicine is only for you. Do not share this medicine with others.  What if I miss a dose?  If you miss a dose, take it as soon as you can. If it is almost time for your next dose, take only that dose. Do not take  double or extra doses.  What may interact with this medicine?  This medicine may interact with the following medications:  · alcohol  · antihistamines for allergy, cough and cold  · antiviral medicines for HIV or AIDS  · atropine  · certain antibiotics like clarithromycin, erythromycin, linezolid, rifampin  · certain medicines for anxiety or sleep  · certain medicines for bladder problems like oxybutynin, tolterodine  · certain medicines for depression like amitriptyline, fluoxetine, sertraline  · certain medicines for fungal infections like ketoconazole, itraconazole, voriconazole  · certain medicines for migraine headache like almotriptan, eletriptan, frovatriptan, naratriptan, rizatriptan, sumatriptan, zolmitriptan  · certain medicines for nausea or vomiting like dolasetron, ondansetron, palonosetron  · certain medicines for Parkinson's disease like benztropine, trihexyphenidyl  · certain medicines for seizures like phenobarbital, phenytoin, primidone  · certain medicines for stomach problems like dicyclomine, hyoscyamine  · certain medicines for travel sickness like scopolamine  · diuretics  · general anesthetics like halothane, isoflurane, methoxyflurane, propofol  · ipratropium  · local anesthetics like lidocaine, pramoxine, tetracaine  · MAOIs like Carbex, Eldepryl, Marplan, Nardil, and Parnate  · medicines that relax muscles for surgery  · methylene blue  · nilotinib  · other medicines with acetaminophen  · other narcotic medicines for pain or cough  · phenothiazines like chlorpromazine, mesoridazine, prochlorperazine, thioridazine  This list may not describe all possible interactions. Give your health care provider a list of all the medicines, herbs, non-prescription drugs, or dietary supplements you use. Also tell them if you smoke, drink alcohol, or use illegal drugs. Some items may interact with your medicine.  What should I watch for while using this medicine?  Tell your doctor or health care  professional if your pain does not go away, if it gets worse, or if you have new or a different type of pain. You may develop tolerance to the medicine. Tolerance means that you will need a higher dose of the medication for pain relief. Tolerance is normal and is expected if you take this medicine for a long time.  Do not suddenly stop taking your medicine because you may develop a severe reaction. Your body becomes used to the medicine. This does NOT mean you are addicted. Addiction is a behavior related to getting and using a drug for a non-medical reason. If you have pain, you have a medical reason to take pain medicine. Your doctor will tell you how much medicine to take. If your doctor wants you to stop the medicine, the dose will be slowly lowered over time to avoid any side effects.  There are different types of narcotic medicines (opiates). If you take more than one type at the same time or if you are taking another medicine that also causes drowsiness, you may have more side effects. Give your health care provider a list of all medicines you use. Your doctor will tell you how much medicine to take. Do not take more medicine than directed. Call emergency for help if you have problems breathing or unusual sleepiness.  Do not take other medicines that contain acetaminophen with this medicine. Always read labels carefully. If you have questions, ask your doctor or pharmacist.  If you take too much acetaminophen get medical help right away. Too much acetaminophen can be very dangerous and cause liver damage. Even if you do not have symptoms, it is important to get help right away.  You may get drowsy or dizzy. Do not drive, use machinery, or do anything that needs mental alertness until you know how this medicine affects you. Do not stand or sit up quickly, especially if you are an older patient. This reduces the risk of dizzy or fainting spells. Alcohol may interfere with the effect of this medicine. Avoid  alcoholic drinks.  The medicine will cause constipation. Try to have a bowel movement at least every 2 to 3 days. If you do not have a bowel movement for 3 days, call your doctor or health care professional.  Your mouth may get dry. Chewing sugarless gum or sucking hard candy, and drinking plenty or water may help. Contact your doctor if the problem does not go away or is severe.  What side effects may I notice from receiving this medicine?  Side effects that you should report to your doctor or health care professional as soon as possible:  · allergic reactions like skin rash, itching or hives, swelling of the face, lips, or tongue  · breathing problems  · confusion  · redness, blistering, peeling or loosening of the skin, including inside the mouth  · signs and symptoms of liver injury like dark yellow or brown urine; general ill feeling or flu-like symptoms; light-colored stools; loss of appetite; nausea; right upper belly pain; unusually weak or tired; yellowing of the eyes or skin  · signs and symptoms of low blood pressure like dizziness; feeling faint or lightheaded, falls; unusually weak or tired  · trouble passing urine or change in the amount of urine  Side effects that usually do not require medical attention (report to your doctor or health care professional if they continue or are bothersome):  · constipation  · dry mouth  · nausea, vomiting  · tiredness  This list may not describe all possible side effects. Call your doctor for medical advice about side effects. You may report side effects to FDA at 4-130-FDA-1098.  Where should I keep my medicine?  Keep out of the reach of children. This medicine can be abused. Keep your medicine in a safe place to protect it from theft. Do not share this medicine with anyone. Selling or giving away this medicine is dangerous and against the law.  Store at room temperature between 20 and 25 degrees C (68 and 77 degrees F).  This medicine may cause harm and death if it  is taken by other adults, children, or pets. Return medicine that has not been used to an official disposal site. Contact the [facility] at 1-342.618.7673 or your city/[facility] government to find a site. If you cannot return the medicine, flush it down the toilet. Do not use the medicine after the expiration date.  NOTE: This sheet is a summary. It may not cover all possible information. If you have questions about this medicine, talk to your doctor, pharmacist, or health care provider.  © 2020 [employer]vier/[employer] (2018-04-24 15:46:38)      Discharge Instructions    Discharged to home by car with relative. Discharged via wheelchair, hospital escort: Yes.  Special equipment needed: LSO brace     Be sure to schedule a follow-up appointment with your primary care doctor or any specialists as instructed.     Discharge Plan:   Influenza Vaccine Indication: Patient Refuses    I understand that a diet low in cholesterol, fat, and sodium is recommended for good health. Unless I have been given specific instructions below for another diet, I accept this instruction as my diet prescription.   Other diet: regular    Special Instructions:   follow up in 2 weeks in the office.    can shower in 72 hours but is instructed to keep the wound dry.    The patient has also been instructed to abstain from smoking or any anti-inflammatories.   The patient will need to wear an LSO brace when out of bed.      · Is patient discharged on Warfarin / Coumadin?   No     Depression / Suicide Risk    As you are discharged from this [facility] facility, it is important to learn how to keep safe from harming yourself.    Recognize the warning signs:  · Abrupt changes in personality, positive or negative- including increase in energy   · Giving away possessions  · Change in eating patterns- significant weight changes-  positive or negative  · Change in sleeping patterns- unable to sleep or sleeping all the time   · Unwillingness or inability to  communicate  · Depression  · Unusual sadness, discouragement and loneliness  · Talk of wanting to die  · Neglect of personal appearance   · Rebelliousness- reckless behavior  · Withdrawal from people/activities they love  · Confusion- inability to concentrate     If you or a loved one observes any of these behaviors or has concerns about self-harm, here's what you can do:  · Talk about it- your feelings and reasons for harming yourself  · Remove any means that you might use to hurt yourself (examples: pills, rope, extension cords, firearm)  · Get professional help from the community (Mental Health, Substance Abuse, psychological counseling)  · Do not be alone:Call your Safe Contact- someone whom you trust who will be there for you.  · Call your local CRISIS HOTLINE 606-3041 or 841-195-1374  · Call your local Children's Mobile Crisis Response Team [place] (801) 212-2534 or www.[employer]  · Call the toll free National Suicide Prevention Hotlines   · National Suicide Prevention Lifeline 802-665-MGFH (6264)  · National Hope Line Network 800-SUICIDE (133-0952)          Spinal Fusion, Adult, Care After  This sheet gives you information about how to care for yourself after your procedure. Your health care provider may also give you more specific instructions. If you have problems or questions, contact your health care provider.  What can I expect after the procedure?  After the procedure, it is common to have:  · Back pain and stiffness.  · Pain in the incision area.  Follow these instructions at home:  Medicines  · Take over-the-counter and prescription medicines only as told by your health care provider. These include any pain medicines or blood thinning medicines (anticoagulants).  · If you were prescribed an antibiotic medicine, take it as told by your health care provider. Do not stop taking the antibiotic even if you start to feel better.  · Do not drive for 24 hours if you received a medicine to help  you relax (sedative).  · Do not drive or use heavy machinery while taking prescription pain medicine.  If you have a brace:  · Wear the brace as told by your health care provider. Remove it only as told by your health care provider.  · Keep the brace clean.  Managing pain, stiffness, and swelling  · If directed, put ice on the injured area:  ? If you have a removable brace, remove it as told by your health care provider.  ? Put ice in a plastic bag.  ? Place a towel between your skin and the bag.  ? Leave the ice on for 20 minutes, 2-3 times a day.  Incision care    · Follow instructions from your health care provider about how to take care of your incision. Make sure you:  ? Wash your hands with soap and water before you change your bandage (dressing). If soap and water are not available, use hand .  ? Change your dressing as told by your health care provider.  ? Leave stitches (sutures), skin glue, or adhesive strips in place. These skin closures may need to be in place for 2 weeks or longer. If adhesive strip edges start to loosen and curl up, you may trim the loose edges. Do not remove adhesive strips completely unless your health care provider tells you to do that.  · Keep your incision clean and dry. Do not take baths, swim, or use a hot tub until your health care provider approves.  · Check your incision area every day for signs of infection. Check for:  ? More redness, swelling, or pain.  ? Fluid or blood.  ? Warmth.  ? Pus or a bad smell.  Physical activity  · Rest and protect your back as much as possible.  · Follow instructions from your health care provider about how to move and use good posture to help your spine heal.  · Do not lift anything that is heavier than 8 lb (3.6 kg) or as told by your health care provider.  · Do not twist or bend at the waist until your health care provider approves.  · Avoid:  ? Pushing and pulling motions.  ? Lifting anything over your head.  ? Sitting or lying  down in the same position for long periods of time.  · Do not exercise until your health care provider approves. Once your health care provider has approved exercise, ask him or her what kinds of exercises you can do to make your back stronger (physical therapy).  General instructions  · Wear compression stockings and walk at least every few hours as told by your health care provider. Doing this will help to prevent blood clots and reduce swelling in your legs.  · Do not use any products that contain nicotine or tobacco, such as cigarettes and e-cigarettes. These can delay bone healing. If you need help quitting, ask your health care provider.  · To prevent or treat constipation while you are taking prescription pain medicine, your health care provider may recommend that you:  ? Drink enough fluid to keep your urine clear or pale yellow.  ? Take over-the-counter or prescription medicines.  ? Eat foods that are high in fiber, such as fresh fruits and vegetables, whole grains, and beans.  ? Limit foods that are high in fat and processed sugars, such as fried and sweet foods.  · Keep all follow-up visits as told by your health care provider. This is important.  Contact a health care provider if:  · You have pain that gets worse or does not get better with medicine.  · Your legs or feet become painful or swollen.  · You have more redness, swelling, or pain around your incision.  · You have fluid or blood coming from your incision.  · Your incision feels warm to the touch.  · You have pus or a bad smell coming from your incision.  · You have a fever.  · You vomit or feel nausea.  · You have weakness or numbness in your legs that is new or getting worse.  · You have trouble controlling urination or bowel movements.  Get help right away if:  · You have severe pain.  · You have chest pain.  · You have trouble breathing.  · You develop a cough.  These symptoms may represent a serious problem that is an emergency. Do not wait  to see if the symptoms will go away. Get medical help right away. Call your local emergency services (911 in the [place] Do not drive yourself to the hospital.  Summary  · It is common to have pain at the back and incision area.  · Icing and pain medicines may help to control the pain. Follow directions from your health care provider.  · Rest and protect your back as much as possible. Do not twist or bend at the waist. Get up and walk at least every few hours as told by your health care provider.  This information is not intended to replace advice given to you by your health care provider. Make sure you discuss any questions you have with your health care provider.  Document Released: 07/07/2006 Document Revised: 09/07/2019 Document Reviewed: 12/06/2017  Elsevier Patient Education © 2020 [employer]

## 2020-10-22 NOTE — PROGRESS NOTES
0700 Patient's I bed. Bedside report received from PAL Candelario RN at the beginning of the shift.    0740 REBECA Bello and Dr Whitfield visited. POC discussed with the patient. New order received to d/c soto catheter.     0750 Patient's sitting up in bed. Rates abdominal pain 6/10, on   Morphine PCA. Educated on the importance/use of IS at least 10x evefry our while awake, able to reach, able to reach 2500.   Ice pack given. FC dc'd as per order. Assessment completed. No distress noted. Plan of care reviewed with the patient. Verbalized understanding.    0803 New order reiceived for the Morphine PCA to be dc'd.     0813 New order recieved and acknowledged for the patient to bed dc'd home and mobilize with PT/OT.    0833 Morphine PCA dc'd as per order.    0835 Vira PT worked with the patient. Ambulated in the hallway with FWW.    0915 Nalini, OT/Student PT worked with the patient.     1135 Medicated with Percocet (see MAR) for c/o's abdominal pain (surgical pain), rates pain 7/10. Ice pack given.     1100 Patient voided without difficulty.     1240 Spoke to REBECA Bello. Notified the said REBECA that per PT's recommendation patient would benefit for a FWW for home. REBECA spoke to the patient. Per patient he has access to a FWW at home.    1433  Discharge instructions given to the patient. Questions answered. Scripts sent electronically to the pharmacy. Discharged with patient's belongings,  LSO brace via w/c accompanied by one female CNA & spouse via Private car.

## 2020-10-22 NOTE — THERAPY
Physical Therapy   Initial Evaluation     Patient Name: Niranjan Richter  Age:  45 y.o., Sex:  male  Medical Record #: [id number]  Today's Date: 10/22/2020     Precautions: Lumbosacral Orthosis, Spinal / Back Precautions (LSO when OOB)    Assessment  Patient is 45 y.o. male s/p L5-S1 ALIF, POD#1. Pt presents today with good upright and OOB tolerance, limited mostly by LBP at incision site. Pt appears appropriate for discharge to home with family assist at current functional level. No further acute PT needs.    Plan    Recommend Physical Therapy for Evaluation only.    DC Equipment Recommendations: Front-Wheel Walker  Discharge Recommendations: Anticipate that the patient will have no further physical therapy needs after discharge from the hospital       Subjective    Pt agrees to PT.     Objective       10/22/20 0836   Prior Living Situation   Prior Services None;Home-Independent   Housing / Facility 1 Story House   Steps Into Home 1   Steps In Home 0   Equipment Owned None   Lives with - Patient's Self Care Capacity Spouse   Comments spouse can assist at discharge   Prior Level of Functional Mobility   Bed Mobility Independent   Transfer Status Independent   Ambulation Independent   Distance Ambulation (Feet) 300   Assistive Devices Used None   Stairs Independent   Cognition    Cognition / Consciousness WDL   Level of Consciousness Alert   Passive ROM Lower Body   Passive ROM Lower Body WDL   Active ROM Lower Body    Active ROM Lower Body  WDL   Strength Lower Body   Lower Body Strength  WDL   Sensation Lower Body   Lower Extremity Sensation   WDL   Coordination Lower Body    Coordination Lower Body  WDL   Balance Assessment   Sitting Balance (Static) Fair +   Sitting Balance (Dynamic) Fair   Standing Balance (Static) Fair +   Standing Balance (Dynamic) Fair   Weight Shift Sitting Fair   Weight Shift Standing Fair   Comments with FWW   Gait Analysis   Gait Level Of Assist Supervised   Assistive Device Front  Wheel Walker   Distance (Feet) 150   Deviation Bradykinetic;Decreased Base Of Support;Antalgic   Bed Mobility    Supine to Sit Supervised   Sit to Supine Supervised   Scooting Supervised   Rolling Supervised   Comments using log roll   Functional Mobility   Sit to Stand Supervised   Bed, Chair, Wheelchair Transfer Refused   Mobility gait in sterling   Education Group   Spine Precautions Patient Response Patient;Acceptance;Handout;Verbal Demonstration;Action Demonstration;Explanation   Role of Physical Therapist Patient Response Patient;Acceptance;Explanation;Demonstration;Verbal Demonstration;Action Demonstration   Brace Wear & Care Patient Response Patient;Acceptance;Demonstration;Explanation;Action Demonstration;Verbal Demonstration   Problem List    Problems Pain   Anticipated Discharge Equipment and Recommendations   DC Equipment Recommendations Front-Wheel Walker   Discharge Recommendations Anticipate that the patient will have no further physical therapy needs after discharge from the hospital

## 2020-10-22 NOTE — PROGRESS NOTES
Report received from Day Shift RN at 1915. Assumed care of patient. Plan of care discussed, questions answered. Assessment completed. VSS, A&O x4, states pain at incision site. PCA in place. Call light in reach. Patient educated on use of call light for assistance.

## 2020-10-22 NOTE — THERAPY
"Occupational Therapy   Initial Evaluation     Patient Name: Niranjan Richter  Age:  45 y.o., Sex:  male  Medical Record #: [id number]  Today's Date: 10/22/2020     Precautions: Lumbosacral Orthosis, Spinal / Back Precautions   Comments: LSO on when OOB    Assessment  Patient is 45 y.o. male with a diagnosis of lumbar spinal stenosis. He is now s/p L5-S1 anterior discectomy, interbody fusion, and anterior spinal fixation.  Pt demonstrated the ability to perform ADLs and functional transfers while maintaining spinal precautions. He reports having support as needed from his wife at home. Anticipate no further OT needs in this setting    Plan  Recommend Occupational Therapy for Evaluation only.    DC Equipment Recommendations:  None (pt owns FWW if needed)  Discharge Recommendations:  Anticipate that the patient will have no further occupational therapy needs after discharge from the hospital     Subjective  \"I'm prepared\"     Objective   10/22/20 0933   Total Time Spent   Total Time Spent (Mins) 25   Initial Contact Note    Initial Contact Note Order Received and Verified, Evaluation Only - Patient Does Not Require Further Acute Occupational Therapy at this Time.  However, May Benefit from Post Acute Therapy for Higher Level Functional Deficits.   Prior Living Situation   Prior Services None   Housing / Facility 1 Story House   Steps Into Home 1   Bathroom Set up Bathtub / Shower Combination;Grab Bars   Equipment Owned Grab Bar(s) In Tub / Shower   Lives with - Patient's Self Care Capacity Spouse;Child Less than 18 Years of Age   Comments p reports wife is able to help as needed   Prior Level of ADL Function   Self Feeding Independent   Grooming / Hygiene Independent   Bathing Independent   Dressing Independent   Toileting Independent   Prior Level of IADL Function   Medication Management Independent   Laundry Independent   Kitchen Mobility Independent   Finances Independent   Home Management Independent   Shopping " Independent   Prior Level Of Mobility Independent Without Device in Community   Driving / Transportation Driving Independent   Occupation (Pre-Hospital Vocational) Employed Full Time   Comments 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, but planning career change   Pain 0 - 10 Group   Therapist Pain Assessment During Activity;Nurse Notified  (Pain near incision site; using ice )   Cognition    Cognition / Consciousness WDL   Level of Consciousness Alert   Comments Pt was pleasant and cooperative   Active ROM Upper Body   Active ROM Upper Body  WDL   Strength Upper Body   Upper Body Strength  WDL   Sensation Upper Body   Upper Extremity Sensation  WDL   Upper Body Muscle Tone   Upper Body Muscle Tone  WDL   Coordination Upper Body   Coordination WDL   Balance Assessment   Sitting Balance (Static) Good   Sitting Balance (Dynamic) Fair +   Standing Balance (Static) Fair +   Standing Balance (Dynamic) Fair   Weight Shift Sitting Good   Weight Shift Standing Fair   Comments w/ FWW; did not rely heavily on walker   Bed Mobility    Supine to Sit Supervised   Sit to Supine Supervised   Scooting Supervised   Rolling Supervised   Comments pt demonstrated log roll   ADL Assessment   Grooming Supervision;Standing   Upper Body Dressing Supervision   Lower Body Dressing Supervision   Toileting Supervision   How much help from another person does the patient currently need...   Putting on and taking off regular lower body clothing? 4   Bathing (including washing, rinsing, and drying)? 3   Toileting, which includes using a toilet, bedpan, or urinal? 4   Putting on and taking off regular upper body clothing? 4   Taking care of personal grooming such as brushing teeth? 4   Eating meals? 4   6 Clicks Daily Activity Score 23   Functional Mobility   Sit to Stand Supervised   Toilet Transfers Supervised   Mobility walked from bed into bathroom and back to bed   Comments w/ FWW   Activity Tolerance   Comments c/o pain at incison site; did not  limits pts activity tolerance   Education Group   Education Provided Back Safety;Role of Occupational Therapist;Activities of Daily Living;Home Safety   Role of Occupational Therapist Patient Response Patient;Acceptance;Explanation   Back Safety Patient Response Patient;Eager;Explanation;Demonstration;Handout;Verbal Demonstration;Action Demonstration   Home Safety Patient Response Patient;Acceptance;Explanation;Verbal Demonstration   ADL Patient Response Patient;Acceptance;Explanation;Verbal Demonstration;Action Demonstration   Additional Comments Focused on safety with ADLs, managing brace, and maintenane of spinal precautions   Problem List   Problem List None   Interdisciplinary Plan of Care Collaboration   IDT Collaboration with  Nursing   Patient Position at End of Therapy In Bed;Call Light within Reach;Tray Table within Reach;Phone within Reach   Collaboration Comments RN updated   Session Information   Date / Session Number  10/22 1  (1x only)   Priority 0

## 2020-10-22 NOTE — CARE PLAN
Problem: Pain Management  Goal: Pain level will decrease to patient's comfort goal  Outcome: PROGRESSING AS EXPECTED     Problem: Safety  Goal: Will remain free from injury  Outcome: PROGRESSING AS EXPECTED     Problem: Infection  Goal: Will remain free from infection  Outcome: PROGRESSING AS EXPECTED     Problem: Urinary Elimination:  Goal: Ability to reestablish a normal urinary elimination pattern will improve  Outcome: PROGRESSING AS EXPECTED

## 2020-10-22 NOTE — DISCHARGE SUMMARY
Discharge Summary    CHIEF COMPLAINT ON ADMISSION  No chief complaint on file.      Reason for Admission  STENOSIS LUMBAR SPINE     Admission Date  10/21/2020    CODE STATUS  Full Code    HPI & HOSPITAL COURSE  This is a 46-year-old man.  He has chronic back and left leg symptoms.  He has mechanical back pain.  He has muscle spasms.  His back pain is 5 out of 10 when he sitting is worse when he stands.  There is left posterior thigh pain with numbness.  We tried all the conservative measures and I did not work.  He had a reduced range of motion lumbar spine with some patchy numbness in the lower extremities.  He had a degenerative disc with bilateral foraminal stenosis.  We consequently offered him surgery.  He also has known thoracic outlet syndrome and had surgery for this.     He underwent L5-S1 ALIF without complication. On POD #1 he met criteria for D/C and was sent home.       Therefore, he is discharged in good and stable condition to home with close outpatient follow-up.    The patient recovered much more quickly than anticipated on admission.    Discharge Date  10/22/2020    FOLLOW UP ITEMS POST DISCHARGE  Follow up with Dr. Whitfield in 2 and 6 weeks  No NSAIDs  Ok to shower in 2 days    DISCHARGE DIAGNOSES  Active Problems:    * No active hospital problems. *  Resolved Problems:    * No resolved hospital problems. *      FOLLOW UP  No future appointments.  Vandana Whitfield M.D.  [address] [place] [id number] [phone]    Schedule an appointment as soon as possible for a visit in 2 weeks  If symptoms worsen, As needed, For wound re-check, For suture removal, for follow up      MEDICATIONS ON DISCHARGE     Medication List      START taking these medications      Instructions   cephALEXin 500 MG Caps  Commonly known as: KEFLEX   Take 1 Cap by mouth 4 times a day.  Dose: 500 mg     oxyCODONE-acetaminophen 5-325 MG Tabs  Commonly known as: PERCOCET   Take 1 Tab by mouth every four hours as  needed for up to 5 days.  Dose: 1 Tab        CHANGE how you take these medications      Instructions   methocarbamol 750 MG Tabs  What changed:   · when to take this  · reasons to take this  Commonly known as: ROBAXIN   Take 1 Tab by mouth every 8 hours as needed.  Dose: 750 mg        STOP taking these medications    meloxicam 15 MG tablet  Commonly known as: MOBIC            Allergies  Allergies   Allergen Reactions   • Gabapentin Unspecified     Ended up in ER- shaking, convulsions       DIET  Orders Placed This Encounter   Procedures   • Diet Order Regular     Standing Status:   Standing     Number of Occurrences:   1     Order Specific Question:   Diet:     Answer:   Regular [1]       ACTIVITY  As tolerated.  Weight bearing as tolerated    CONSULTATIONS  none    PROCEDURES  L5-S1 ALIF    LABORATORY  Lab Results   Component Value Date    SODIUM 136 10/22/2020    POTASSIUM 4.0 10/22/2020    CHLORIDE 102 10/22/2020    CO2 26 10/22/2020    GLUCOSE 114 (H) 10/22/2020    BUN 11 10/22/2020    CREATININE 0.68 10/22/2020        Lab Results   Component Value Date    WBC 8.6 10/22/2020    HEMOGLOBIN 13.1 (L) 10/22/2020    HEMATOCRIT 38.4 (L) 10/22/2020    PLATELETCT 235 10/22/2020        Total time of the discharge process exceeds 45 minutes.

## 2020-11-25 ENCOUNTER — HOSPITAL ENCOUNTER (OUTPATIENT)
Dept: RADIOLOGY | Facility: MEDICAL CENTER | Age: 45
End: 2020-11-25
Attending: PHYSICIAN ASSISTANT
Payer: COMMERCIAL

## 2020-11-25 DIAGNOSIS — M47.816 LUMBAR SPONDYLOSIS: ICD-10-CM

## 2020-11-25 PROCEDURE — 72100 X-RAY EXAM L-S SPINE 2/3 VWS: CPT

## 2021-01-12 ENCOUNTER — HOSPITAL ENCOUNTER (OUTPATIENT)
Dept: RADIOLOGY | Facility: MEDICAL CENTER | Age: 46
End: 2021-01-12
Attending: PHYSICIAN ASSISTANT
Payer: COMMERCIAL

## 2021-01-12 DIAGNOSIS — M48.061 SPINAL STENOSIS OF LUMBAR REGION, UNSPECIFIED WHETHER NEUROGENIC CLAUDICATION PRESENT: ICD-10-CM

## 2021-01-12 PROCEDURE — 72110 X-RAY EXAM L-2 SPINE 4/>VWS: CPT

## 2021-04-08 ENCOUNTER — HOSPITAL ENCOUNTER (OUTPATIENT)
Dept: RADIOLOGY | Facility: MEDICAL CENTER | Age: 46
End: 2021-04-08
Attending: STUDENT IN AN ORGANIZED HEALTH CARE EDUCATION/TRAINING PROGRAM
Payer: COMMERCIAL

## 2021-04-08 DIAGNOSIS — M47.816 LUMBAR SPONDYLOSIS: ICD-10-CM

## 2021-04-08 PROCEDURE — 72110 X-RAY EXAM L-2 SPINE 4/>VWS: CPT

## 2021-05-26 ENCOUNTER — HOSPITAL ENCOUNTER (OUTPATIENT)
Dept: LAB | Facility: MEDICAL CENTER | Age: 46
End: 2021-05-26
Attending: PHYSICIAN ASSISTANT
Payer: COMMERCIAL

## 2021-05-26 LAB
FOLATE SERPL-MCNC: 18.2 NG/ML
VIT B12 SERPL-MCNC: 386 PG/ML (ref 211–911)

## 2021-05-26 PROCEDURE — 82607 VITAMIN B-12: CPT

## 2021-05-26 PROCEDURE — 82746 ASSAY OF FOLIC ACID SERUM: CPT

## 2021-05-26 PROCEDURE — 36415 COLL VENOUS BLD VENIPUNCTURE: CPT

## 2021-06-03 ENCOUNTER — HOSPITAL ENCOUNTER (OUTPATIENT)
Dept: LAB | Facility: MEDICAL CENTER | Age: 46
End: 2021-06-03
Attending: SPECIALIST
Payer: COMMERCIAL

## 2021-06-03 LAB — AMMONIA PLAS-SCNC: 27 UMOL/L (ref 11–45)

## 2021-06-03 PROCEDURE — 36415 COLL VENOUS BLD VENIPUNCTURE: CPT

## 2021-06-03 PROCEDURE — 86038 ANTINUCLEAR ANTIBODIES: CPT

## 2021-06-03 PROCEDURE — 82140 ASSAY OF AMMONIA: CPT

## 2021-06-05 LAB — NUCLEAR IGG SER QL IA: NORMAL

## 2021-06-30 ENCOUNTER — HOSPITAL ENCOUNTER (OUTPATIENT)
Dept: RADIOLOGY | Facility: MEDICAL CENTER | Age: 46
End: 2021-06-30
Attending: SPECIALIST
Payer: COMMERCIAL

## 2021-06-30 DIAGNOSIS — G25.3 MYOCLONUS: ICD-10-CM

## 2021-06-30 PROCEDURE — A9576 INJ PROHANCE MULTIPACK: HCPCS | Performed by: SPECIALIST

## 2021-06-30 PROCEDURE — 700117 HCHG RX CONTRAST REV CODE 255: Performed by: SPECIALIST

## 2021-06-30 PROCEDURE — 70553 MRI BRAIN STEM W/O & W/DYE: CPT

## 2021-06-30 RX ADMIN — GADOTERIDOL 15 ML: 279.3 INJECTION, SOLUTION INTRAVENOUS at 14:29

## 2021-07-09 ENCOUNTER — HOSPITAL ENCOUNTER (OUTPATIENT)
Dept: LAB | Facility: MEDICAL CENTER | Age: 46
End: 2021-07-09
Attending: SPECIALIST
Payer: COMMERCIAL

## 2021-07-09 LAB
ALBUMIN SERPL BCP-MCNC: 4.4 G/DL (ref 3.2–4.9)
ALBUMIN/GLOB SERPL: 1.9 G/DL
ALP SERPL-CCNC: 92 U/L (ref 30–99)
ALT SERPL-CCNC: 16 U/L (ref 2–50)
ANION GAP SERPL CALC-SCNC: 12 MMOL/L (ref 7–16)
AST SERPL-CCNC: 20 U/L (ref 12–45)
BASOPHILS # BLD AUTO: 1 % (ref 0–1.8)
BASOPHILS # BLD: 0.05 K/UL (ref 0–0.12)
BILIRUB SERPL-MCNC: 0.3 MG/DL (ref 0.1–1.5)
BUN SERPL-MCNC: 15 MG/DL (ref 8–22)
CALCIUM SERPL-MCNC: 8.8 MG/DL (ref 8.5–10.5)
CHLORIDE SERPL-SCNC: 106 MMOL/L (ref 96–112)
CO2 SERPL-SCNC: 24 MMOL/L (ref 20–33)
CREAT SERPL-MCNC: 0.8 MG/DL (ref 0.5–1.4)
EOSINOPHIL # BLD AUTO: 0.14 K/UL (ref 0–0.51)
EOSINOPHIL NFR BLD: 2.9 % (ref 0–6.9)
ERYTHROCYTE [DISTWIDTH] IN BLOOD BY AUTOMATED COUNT: 50.4 FL (ref 35.9–50)
GLOBULIN SER CALC-MCNC: 2.3 G/DL (ref 1.9–3.5)
GLUCOSE SERPL-MCNC: 102 MG/DL (ref 65–99)
HCT VFR BLD AUTO: 44.1 % (ref 42–52)
HGB BLD-MCNC: 14.4 G/DL (ref 14–18)
IMM GRANULOCYTES # BLD AUTO: 0.01 K/UL (ref 0–0.11)
IMM GRANULOCYTES NFR BLD AUTO: 0.2 % (ref 0–0.9)
LYMPHOCYTES # BLD AUTO: 1.99 K/UL (ref 1–4.8)
LYMPHOCYTES NFR BLD: 40.9 % (ref 22–41)
MCH RBC QN AUTO: 34.5 PG (ref 27–33)
MCHC RBC AUTO-ENTMCNC: 32.7 G/DL (ref 33.7–35.3)
MCV RBC AUTO: 105.8 FL (ref 81.4–97.8)
MONOCYTES # BLD AUTO: 0.31 K/UL (ref 0–0.85)
MONOCYTES NFR BLD AUTO: 6.4 % (ref 0–13.4)
NEUTROPHILS # BLD AUTO: 2.36 K/UL (ref 1.82–7.42)
NEUTROPHILS NFR BLD: 48.6 % (ref 44–72)
NRBC # BLD AUTO: 0 K/UL
NRBC BLD-RTO: 0 /100 WBC
PLATELET # BLD AUTO: 260 K/UL (ref 164–446)
PMV BLD AUTO: 9.7 FL (ref 9–12.9)
POTASSIUM SERPL-SCNC: 4.7 MMOL/L (ref 3.6–5.5)
PROT SERPL-MCNC: 6.7 G/DL (ref 6–8.2)
RBC # BLD AUTO: 4.17 M/UL (ref 4.7–6.1)
SODIUM SERPL-SCNC: 142 MMOL/L (ref 135–145)
VALPROATE SERPL-MCNC: 67.4 UG/ML (ref 50–100)
WBC # BLD AUTO: 4.9 K/UL (ref 4.8–10.8)

## 2021-07-09 PROCEDURE — 80164 ASSAY DIPROPYLACETIC ACD TOT: CPT

## 2021-07-09 PROCEDURE — 80053 COMPREHEN METABOLIC PANEL: CPT

## 2021-07-09 PROCEDURE — 85025 COMPLETE CBC W/AUTO DIFF WBC: CPT

## 2021-07-09 PROCEDURE — 36415 COLL VENOUS BLD VENIPUNCTURE: CPT

## 2021-10-14 ENCOUNTER — HOSPITAL ENCOUNTER (OUTPATIENT)
Dept: LAB | Facility: MEDICAL CENTER | Age: 46
End: 2021-10-14
Attending: SPECIALIST
Payer: COMMERCIAL

## 2021-10-14 LAB
ALBUMIN SERPL BCP-MCNC: 4.4 G/DL (ref 3.2–4.9)
ALBUMIN/GLOB SERPL: 1.8 G/DL
ALP SERPL-CCNC: 78 U/L (ref 30–99)
ALT SERPL-CCNC: 10 U/L (ref 2–50)
ANION GAP SERPL CALC-SCNC: 10 MMOL/L (ref 7–16)
ANISOCYTOSIS BLD QL SMEAR: ABNORMAL
AST SERPL-CCNC: 14 U/L (ref 12–45)
BASOPHILS # BLD AUTO: 0.9 % (ref 0–1.8)
BASOPHILS # BLD: 0.03 K/UL (ref 0–0.12)
BILIRUB SERPL-MCNC: 0.4 MG/DL (ref 0.1–1.5)
BUN SERPL-MCNC: 14 MG/DL (ref 8–22)
CALCIUM SERPL-MCNC: 9.1 MG/DL (ref 8.5–10.5)
CHLORIDE SERPL-SCNC: 104 MMOL/L (ref 96–112)
CO2 SERPL-SCNC: 25 MMOL/L (ref 20–33)
CREAT SERPL-MCNC: 0.74 MG/DL (ref 0.5–1.4)
EOSINOPHIL # BLD AUTO: 0.16 K/UL (ref 0–0.51)
EOSINOPHIL NFR BLD: 4.4 % (ref 0–6.9)
ERYTHROCYTE [DISTWIDTH] IN BLOOD BY AUTOMATED COUNT: 48.8 FL (ref 35.9–50)
FASTING STATUS PATIENT QL REPORTED: NORMAL
GLOBULIN SER CALC-MCNC: 2.4 G/DL (ref 1.9–3.5)
GLUCOSE SERPL-MCNC: 90 MG/DL (ref 65–99)
HCT VFR BLD AUTO: 41.1 % (ref 42–52)
HGB BLD-MCNC: 13.8 G/DL (ref 14–18)
LYMPHOCYTES # BLD AUTO: 1.93 K/UL (ref 1–4.8)
LYMPHOCYTES NFR BLD: 52.2 % (ref 22–41)
MACROCYTES BLD QL SMEAR: ABNORMAL
MANUAL DIFF BLD: NORMAL
MCH RBC QN AUTO: 35.1 PG (ref 27–33)
MCHC RBC AUTO-ENTMCNC: 33.6 G/DL (ref 33.7–35.3)
MCV RBC AUTO: 104.6 FL (ref 81.4–97.8)
MONOCYTES # BLD AUTO: 0.2 K/UL (ref 0–0.85)
MONOCYTES NFR BLD AUTO: 5.3 % (ref 0–13.4)
MORPHOLOGY BLD-IMP: NORMAL
NEUTROPHILS # BLD AUTO: 1.38 K/UL (ref 1.82–7.42)
NEUTROPHILS NFR BLD: 36.3 % (ref 44–72)
NEUTS BAND NFR BLD MANUAL: 0.9 % (ref 0–10)
NRBC # BLD AUTO: 0 K/UL
NRBC BLD-RTO: 0 /100 WBC
PLATELET # BLD AUTO: 266 K/UL (ref 164–446)
PLATELET BLD QL SMEAR: NORMAL
PMV BLD AUTO: 9.5 FL (ref 9–12.9)
POTASSIUM SERPL-SCNC: 4.4 MMOL/L (ref 3.6–5.5)
PROT SERPL-MCNC: 6.8 G/DL (ref 6–8.2)
RBC # BLD AUTO: 3.93 M/UL (ref 4.7–6.1)
RBC BLD AUTO: PRESENT
SODIUM SERPL-SCNC: 139 MMOL/L (ref 135–145)
WBC # BLD AUTO: 3.7 K/UL (ref 4.8–10.8)

## 2021-10-14 PROCEDURE — 85027 COMPLETE CBC AUTOMATED: CPT

## 2021-10-14 PROCEDURE — 85007 BL SMEAR W/DIFF WBC COUNT: CPT

## 2021-10-14 PROCEDURE — 80053 COMPREHEN METABOLIC PANEL: CPT

## 2021-10-14 PROCEDURE — 36415 COLL VENOUS BLD VENIPUNCTURE: CPT

## 2022-02-23 ENCOUNTER — APPOINTMENT (OUTPATIENT)
Dept: LAB | Facility: MEDICAL CENTER | Age: 47
End: 2022-02-23
Payer: COMMERCIAL

## 2025-02-07 ENCOUNTER — OFFICE VISIT (OUTPATIENT)
Dept: MEDICAL GROUP | Facility: CLINIC | Age: 50
End: 2025-02-07
Payer: COMMERCIAL

## 2025-02-07 VITALS
BODY MASS INDEX: 22.15 KG/M2 | OXYGEN SATURATION: 95 % | DIASTOLIC BLOOD PRESSURE: 91 MMHG | HEART RATE: 76 BPM | WEIGHT: 158.2 LBS | SYSTOLIC BLOOD PRESSURE: 138 MMHG | TEMPERATURE: 97.8 F | HEIGHT: 71 IN

## 2025-02-07 DIAGNOSIS — I10 PRIMARY HYPERTENSION: ICD-10-CM

## 2025-02-07 DIAGNOSIS — Z87.898 HISTORY OF SEIZURE: ICD-10-CM

## 2025-02-07 PROCEDURE — 99214 OFFICE O/P EST MOD 30 MIN: CPT | Mod: GC

## 2025-02-07 PROCEDURE — 3080F DIAST BP >= 90 MM HG: CPT | Mod: GC

## 2025-02-07 PROCEDURE — 3075F SYST BP GE 130 - 139MM HG: CPT | Mod: GC

## 2025-02-07 RX ORDER — LISINOPRIL 40 MG/1
40 TABLET ORAL DAILY
Qty: 100 TABLET | Refills: 3 | Status: SHIPPED | OUTPATIENT
Start: 2025-02-07 | End: 2026-03-14

## 2025-02-07 RX ORDER — PANTOPRAZOLE SODIUM 40 MG/1
40 TABLET, DELAYED RELEASE ORAL DAILY
COMMUNITY
Start: 2024-10-14

## 2025-02-07 RX ORDER — DIVALPROEX SODIUM 250 MG/1
750 TABLET, DELAYED RELEASE ORAL 2 TIMES DAILY
COMMUNITY
End: 2025-02-07

## 2025-02-07 RX ORDER — DIVALPROEX SODIUM 250 MG/1
750 TABLET, DELAYED RELEASE ORAL 3 TIMES DAILY
Qty: 810 TABLET | Refills: 0 | Status: SHIPPED | OUTPATIENT
Start: 2025-02-07 | End: 2025-05-08

## 2025-02-07 RX ORDER — LOSARTAN POTASSIUM 50 MG/1
50 TABLET ORAL DAILY
COMMUNITY
Start: 2024-10-14

## 2025-02-07 RX ORDER — BENZONATATE 200 MG/1
200 CAPSULE ORAL 3 TIMES DAILY
COMMUNITY
Start: 2025-01-18

## 2025-02-07 SDOH — ECONOMIC STABILITY: TRANSPORTATION INSECURITY
IN THE PAST 12 MONTHS, HAS THE LACK OF TRANSPORTATION KEPT YOU FROM MEDICAL APPOINTMENTS OR FROM GETTING MEDICATIONS?: YES

## 2025-02-07 SDOH — ECONOMIC STABILITY: TRANSPORTATION INSECURITY
IN THE PAST 12 MONTHS, HAS LACK OF RELIABLE TRANSPORTATION KEPT YOU FROM MEDICAL APPOINTMENTS, MEETINGS, WORK OR FROM GETTING THINGS NEEDED FOR DAILY LIVING?: YES

## 2025-02-07 SDOH — ECONOMIC STABILITY: INCOME INSECURITY: HOW HARD IS IT FOR YOU TO PAY FOR THE VERY BASICS LIKE FOOD, HOUSING, MEDICAL CARE, AND HEATING?: NOT HARD AT ALL

## 2025-02-07 SDOH — HEALTH STABILITY: PHYSICAL HEALTH: ON AVERAGE, HOW MANY MINUTES DO YOU ENGAGE IN EXERCISE AT THIS LEVEL?: 150+ MIN

## 2025-02-07 SDOH — HEALTH STABILITY: MENTAL HEALTH
STRESS IS WHEN SOMEONE FEELS TENSE, NERVOUS, ANXIOUS, OR CAN'T SLEEP AT NIGHT BECAUSE THEIR MIND IS TROUBLED. HOW STRESSED ARE YOU?: TO SOME EXTENT

## 2025-02-07 SDOH — ECONOMIC STABILITY: INCOME INSECURITY: IN THE LAST 12 MONTHS, WAS THERE A TIME WHEN YOU WERE NOT ABLE TO PAY THE MORTGAGE OR RENT ON TIME?: PATIENT DECLINED

## 2025-02-07 SDOH — ECONOMIC STABILITY: FOOD INSECURITY: WITHIN THE PAST 12 MONTHS, YOU WORRIED THAT YOUR FOOD WOULD RUN OUT BEFORE YOU GOT MONEY TO BUY MORE.: NEVER TRUE

## 2025-02-07 SDOH — ECONOMIC STABILITY: FOOD INSECURITY: WITHIN THE PAST 12 MONTHS, THE FOOD YOU BOUGHT JUST DIDN'T LAST AND YOU DIDN'T HAVE MONEY TO GET MORE.: NEVER TRUE

## 2025-02-07 SDOH — HEALTH STABILITY: PHYSICAL HEALTH: ON AVERAGE, HOW MANY DAYS PER WEEK DO YOU ENGAGE IN MODERATE TO STRENUOUS EXERCISE (LIKE A BRISK WALK)?: 5 DAYS

## 2025-02-07 SDOH — ECONOMIC STABILITY: TRANSPORTATION INSECURITY
IN THE PAST 12 MONTHS, HAS LACK OF TRANSPORTATION KEPT YOU FROM MEETINGS, WORK, OR FROM GETTING THINGS NEEDED FOR DAILY LIVING?: YES

## 2025-02-07 SDOH — ECONOMIC STABILITY: HOUSING INSECURITY
IN THE LAST 12 MONTHS, WAS THERE A TIME WHEN YOU DID NOT HAVE A STEADY PLACE TO SLEEP OR SLEPT IN A SHELTER (INCLUDING NOW)?: PATIENT DECLINED

## 2025-02-07 ASSESSMENT — LIFESTYLE VARIABLES
HOW OFTEN DO YOU HAVE A DRINK CONTAINING ALCOHOL: 4 OR MORE TIMES A WEEK
SKIP TO QUESTIONS 9-10: 0
HOW OFTEN DO YOU HAVE SIX OR MORE DRINKS ON ONE OCCASION: LESS THAN MONTHLY
AUDIT-C TOTAL SCORE: 6
HOW MANY STANDARD DRINKS CONTAINING ALCOHOL DO YOU HAVE ON A TYPICAL DAY: 3 OR 4

## 2025-02-07 ASSESSMENT — PATIENT HEALTH QUESTIONNAIRE - PHQ9: CLINICAL INTERPRETATION OF PHQ2 SCORE: 0

## 2025-02-07 ASSESSMENT — SOCIAL DETERMINANTS OF HEALTH (SDOH)
IN THE PAST 12 MONTHS, HAS THE ELECTRIC, GAS, OIL, OR WATER COMPANY THREATENED TO SHUT OFF SERVICE IN YOUR HOME?: PATIENT DECLINED
IN A TYPICAL WEEK, HOW MANY TIMES DO YOU TALK ON THE PHONE WITH FAMILY, FRIENDS, OR NEIGHBORS?: TWICE A WEEK
HOW OFTEN DO YOU GET TOGETHER WITH FRIENDS OR RELATIVES?: TWICE A WEEK
WITHIN THE PAST 12 MONTHS, YOU WORRIED THAT YOUR FOOD WOULD RUN OUT BEFORE YOU GOT THE MONEY TO BUY MORE: NEVER TRUE
HOW OFTEN DO YOU HAVE SIX OR MORE DRINKS ON ONE OCCASION: LESS THAN MONTHLY
ARE YOU MARRIED, WIDOWED, DIVORCED, SEPARATED, NEVER MARRIED, OR LIVING WITH A PARTNER?: LIVING WITH PARTNER
IN A TYPICAL WEEK, HOW MANY TIMES DO YOU TALK ON THE PHONE WITH FAMILY, FRIENDS, OR NEIGHBORS?: TWICE A WEEK
ARE YOU MARRIED, WIDOWED, DIVORCED, SEPARATED, NEVER MARRIED, OR LIVING WITH A PARTNER?: LIVING WITH PARTNER
HOW OFTEN DO YOU HAVE A DRINK CONTAINING ALCOHOL: 4 OR MORE TIMES A WEEK
HOW OFTEN DO YOU ATTEND CHURCH OR RELIGIOUS SERVICES?: NEVER
HOW MANY DRINKS CONTAINING ALCOHOL DO YOU HAVE ON A TYPICAL DAY WHEN YOU ARE DRINKING: 3 OR 4
DO YOU BELONG TO ANY CLUBS OR ORGANIZATIONS SUCH AS CHURCH GROUPS UNIONS, FRATERNAL OR ATHLETIC GROUPS, OR SCHOOL GROUPS?: YES
DO YOU BELONG TO ANY CLUBS OR ORGANIZATIONS SUCH AS CHURCH GROUPS UNIONS, FRATERNAL OR ATHLETIC GROUPS, OR SCHOOL GROUPS?: YES
HOW OFTEN DO YOU ATTENT MEETINGS OF THE CLUB OR ORGANIZATION YOU BELONG TO?: NEVER
HOW OFTEN DO YOU GET TOGETHER WITH FRIENDS OR RELATIVES?: TWICE A WEEK
HOW OFTEN DO YOU ATTENT MEETINGS OF THE CLUB OR ORGANIZATION YOU BELONG TO?: NEVER
HOW OFTEN DO YOU ATTEND CHURCH OR RELIGIOUS SERVICES?: NEVER
HOW HARD IS IT FOR YOU TO PAY FOR THE VERY BASICS LIKE FOOD, HOUSING, MEDICAL CARE, AND HEATING?: NOT HARD AT ALL

## 2025-02-07 NOTE — PATIENT INSTRUCTIONS
-Refills sent   -3/24 colonoscopy  -Referral to neurology sent. They should contact you within 10 days. If you don't hear from them let me know on MyChart  -We will get blood work results from [email or web address]   -Follow in 1 month on above

## 2025-02-07 NOTE — ASSESSMENT & PLAN NOTE
Chronic.  Borderline control.  Discussed options for treatment evaluation.  Shared decision making:  - Lisinopril 40 mg daily refilled  - Will obtain blood work results from October from [facility] and have patient follow-up in 1 month to determine if further labs are needed

## 2025-02-07 NOTE — PROGRESS NOTES
"Subjective:     CC:   Chief Complaint   Patient presents with    New Patient     Np to [facility] care     Hypertension    Referral Needed         HPI:   Niranjan presents today with    Problem   Primary Hypertension    Patient with history of hypertension. States he takes lisinopril 40 mg daily.  Reports his blood pressure ranges in the 130 140/ range.  No headache change of vision heart palpitations chest pain leg swelling.     History of Seizure    Patient with history of seizures starting in 2022. Was with Dr. Chavez on Depakote last seen 2023.  Patient reports he switched insurances at the end of 2024 and now needs a new referral to neurology. Now on Depakote 750 mg BID.  Needs a refill of his Depakote as he did not anticipate being unable to see Dr. Chavez with his insurance difficulties.  Has never had MRI of his brain. With last seizure on 10/13/24. Was seen at [facility].     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          ROS: See HPI.     Objective:     Exam:  BP (!) 138/91 (BP Location: Right arm, Patient Position: Sitting, BP Cuff Size: Adult)   Pulse 76   Temp 36.6 °C (97.8 °F)   Ht 1.803 m (5' 11\")   Wt 71.8 kg (158 lb 3.2 oz)   SpO2 95%   BMI 22.06 kg/m²  Body mass index is 22.06 kg/m².    Physical Exam:  General: Pt resting in NAD, cooperative   Skin:  No cyanosis or jaundice   HEENT: NC/AT. EOMI. No conjunctival injection or sclera icterus.   Lungs:  CTAB, good air movement. Non-labored.   Cardiovascular:  S1/S2 RRR   Abdomen:  Abdomen is soft, non-tender, non-distended, +BS  Extremities:  No LE edema   CNS: CN II through XII intact.  5 out of 5 strength in bilateral upper extremity.  4 out of 5 strength in the right L3 distribution otherwise intact strength in his lowers.  Decreased sensation in his left lateral calf.  Psych: Appropriate mood and affect     Labs: Reviewed    Assessment & Plan:     49 y.o. male with the following -     Problem List Items Addressed This " Visit       Primary hypertension     Chronic.  Borderline control.  Discussed options for treatment evaluation.  Shared decision making:  - Lisinopril 40 mg daily refilled  - Will obtain blood work results from October from [facility] and have patient follow-up in 1 month to determine if further labs are needed         Relevant Medications    losartan (COZAAR) 50 MG Tab    lisinopril (PRINIVIL) 40 MG tablet    History of seizure     Chronic.  Stable.  Has not seen neurology since his last seizure.  Neurologically intact.  Does have some 4 out of 5 right L3 dermatomal strength with decreased sensation on the left in the L5-S1 dermatome.  This is not new as patient does have surgical correction of his lumbar spine.  Uncertain trigger for his last seizure.  Discussed options for treatment evaluation.  Shared decision making:  - Will refill Depakote 750 mg twice daily.  Discussed with patient this is a one-time bridge to get him to neurology  - MRI brain given has not been performed per patient  - ED and return precautions discussed         Relevant Medications    divalproex (DEPAKOTE) 250 MG Tablet Delayed Response    Other Relevant Orders    MR-BRAIN-W/O    Referral to Neurology       Follow-up in 1 month or sooner with new or worsening concerns.  ED and return precautions discussed

## 2025-02-07 NOTE — ASSESSMENT & PLAN NOTE
Chronic.  Stable.  Has not seen neurology since his last seizure.  Neurologically intact.  Does have some 4 out of 5 right L3 dermatomal strength with decreased sensation on the left in the L5-S1 dermatome.  This is not new as patient does have surgical correction of his lumbar spine.  Uncertain trigger for his last seizure.  Discussed options for treatment evaluation.  Shared decision making:  - Will refill Depakote 750 mg twice daily.  Discussed with patient this is a one-time bridge to get him to neurology  - MRI brain given has not been performed per patient  - ED and return precautions discussed

## 2025-02-12 NOTE — Clinical Note
REFERRAL APPROVAL NOTICE         Sent on February 12, 2025                   Niranjan Harinder Neelam  [address] [place] [address]                   Dear Mr. Richter,    After a careful review of the medical information and benefit coverage, [facility] has processed your referral. See below for additional details.    If applicable, you must be actively enrolled with your insurance for coverage of the authorized service. If you have any questions regarding your coverage, please contact your insurance directly.    REFERRAL INFORMATION   Referral #:  [id number]  Referred-To Department    Referred-By Provider:  Neurology    Luisa Toscano M.D.   [facility]      [address]  [place] [address]  [phone] [address], [address]  [place] [id number] [phone]    Referral Start Date:  02/07/2025  Referral End Date:   02/07/2026           SCHEDULING  If you do not already have an appointment, please call [phone] to make an appointment.   MORE INFORMATION  As a reminder, [facility] ownership has changed, meaning this location is now owned and operated by [facility]. As such, we want to clarify that our patients should expect to receive two separate bills for the services received at [facility] - one representing the [facility] facility fees as the owner of the establishment, and the other to represent the physician's services and subsequent fees. You can speak with your insurance carrier for a pricing estimate by calling the customer service number on the back of your card and ask about charges for a hospital outpatient visit.  If you do not already have a [employer] account, sign up at: [email or web address]  You can access your medical information, make appointments, see lab results, billing information, and more.  If you have questions regarding this referral, please contact  the [facility] Referrals department at:             [phone]. Monday - Friday  7:30AM - 5:00PM.      Sincerely,  [facility]

## 2025-02-13 ENCOUNTER — TELEPHONE (OUTPATIENT)
Dept: HEALTH INFORMATION MANAGEMENT | Facility: OTHER | Age: 50
End: 2025-02-13
Payer: COMMERCIAL

## 2025-03-05 NOTE — Clinical Note
Member Name: Niranjan Richter   Member Number: [id number]   Reference Number: [id number]   Approved Services: Outpatient Surgery   Approved Service Dates: 03/05/2025 - 06/05/2025   Requesting Provider: Jw French   Requested Provider: [facility]     Dear Niranjan Richter:     The following medical service(s) requested by Jw French have been approved:    Procedure Code Procedure Code Name Requested Quantity Approved Quantity Status   38945 (CPT®) [place] ANESTH,INTESTINE,SCOPE UPPER/LOW 1 1 Authorized   23610 (CPT®) [place] UPPER GI ENDOSCOPY,DIAGNOSIS 1 1 Authorized   22739 (CPT®) [place] UPPER GI ENDOSCOPY,BIOPSY 1 1 Authorized   79395 (CPT®) [place] COLONOSCOPY-FLEXIBLE 1 1 Authorized   05181 (CPT®) [place] COLONOSCOPY,BIOPSY 1 1 Authorized   05472 (CPT®) [place] COLONOSCOPY,REMV LESN,SNARE 1 1 Authorized    [place] COLORECTAL SCRN  HI RISK IND 1 1 Authorized    [place] COLON CA SCRN NOT HI RSK IND 1 1 Authorized       Approved Quantity means the number of visits approved for medication treatments and/or medical services.    The services should be provided by [facility] no later than 06/05/2025. Please contact the provider listed below with any questions.     Provider Information:  [facility]  [phone]    Your plan benefit may require a deductible, co-payment or coinsurance for these services. This authorization does not guarantee [facility] will pay the claim for services that you receive. Payment by [facility] for these services is subject to the terms of your Evidence of Coverage or Summary Plan Description, your eligibility at the time of service, and confirmation of benefit coverage.    For any questions or additional information, please contact Customer Service:    [facility]  Customer Service: [phone] or toll free [phone]  TTY users dial: [phone]   Call Center Hours: Mon - Fri 7 AM to 8 PM PST   Office Hours: Mon - Fri 8 AM to 5 PM PST   E-mail:  [email or web address]   Website: [email or web address]       This information is available for free in other languages. Please contact Customer Service at the phone number above for more information. [facility] complies with applicable Federal civil rights laws and does not discriminate on the basis of race, color, national origin, age, disability or sex.      Sincerely,     Healthcare Utilization Management Department     Cc: [facility]   Jw French

## 2025-03-07 ENCOUNTER — APPOINTMENT (OUTPATIENT)
Dept: MEDICAL GROUP | Facility: CLINIC | Age: 50
End: 2025-03-07
Payer: COMMERCIAL

## 2025-03-10 ENCOUNTER — OFFICE VISIT (OUTPATIENT)
Dept: NEUROLOGY | Facility: MEDICAL CENTER | Age: 50
End: 2025-03-10
Attending: PSYCHIATRY & NEUROLOGY
Payer: COMMERCIAL

## 2025-03-10 VITALS
OXYGEN SATURATION: 99 % | HEIGHT: 71 IN | DIASTOLIC BLOOD PRESSURE: 76 MMHG | BODY MASS INDEX: 21.56 KG/M2 | WEIGHT: 154 LBS | SYSTOLIC BLOOD PRESSURE: 122 MMHG | RESPIRATION RATE: 18 BRPM | HEART RATE: 91 BPM | TEMPERATURE: 98.3 F

## 2025-03-10 DIAGNOSIS — Z87.898 HISTORY OF SEIZURE: ICD-10-CM

## 2025-03-10 PROCEDURE — 3074F SYST BP LT 130 MM HG: CPT | Performed by: PSYCHIATRY & NEUROLOGY

## 2025-03-10 PROCEDURE — 99204 OFFICE O/P NEW MOD 45 MIN: CPT | Performed by: PSYCHIATRY & NEUROLOGY

## 2025-03-10 PROCEDURE — 3078F DIAST BP <80 MM HG: CPT | Performed by: PSYCHIATRY & NEUROLOGY

## 2025-03-10 PROCEDURE — 99211 OFF/OP EST MAY X REQ PHY/QHP: CPT | Performed by: PSYCHIATRY & NEUROLOGY

## 2025-03-10 RX ORDER — DIVALPROEX SODIUM 250 MG/1
750 TABLET, DELAYED RELEASE ORAL 2 TIMES DAILY
Qty: 540 TABLET | Refills: 1 | Status: SHIPPED | OUTPATIENT
Start: 2025-03-10

## 2025-03-10 ASSESSMENT — PATIENT HEALTH QUESTIONNAIRE - PHQ9: CLINICAL INTERPRETATION OF PHQ2 SCORE: 0
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
## 2025-03-10 NOTE — PATIENT INSTRUCTIONS
Please continue Depakote 750 mg twice daily with no changes.      Please let our office know if you have any changes in your seizure frequency and/characteristics. Otherwise, please keep the diary of your events and bring it with you at the time of your next follow up visit with our office.     Please take vitamin D3 [id number] internation units daily.     Please note that the following might precipitate seizures: missed doses of antiseizure medications, being sick with fever, stress, fatigue, sleep deprivation, not eating regularly, not drinking enough water, drinking too much alcohol, stopping alcohol suddenly if you are currently using it on a regular/daily basis, and/or using recreational drugs, among others.    Please note that the following might lead to an injury, potentially a life-threatening injury, in case you have a seizure and/or lose awareness while:   - being in a large body of water by yourself, such as bath, pool, lake, ocean, among others (risk of drowning)   - being on unprotected heights (risk of fall)   - being around and/or operating heavy machinery (risk of injury)   - being around open fire/hot surfaces (risk of burns)   - any other activities/circumstances, in which if you lose awareness, you might injure yourself and/or others.    Please call for help (crisis line and/or 911) in case you have thoughts of harming yourself and/or others.    Please stop driving if you experience any changes/loss of awareness and/or seizures.     ------------------------------------------------------------------------------------------  Instructions for your family/caregivers:  Please call 911 if the patient has a seizure longer than 2-3 minutes, if seizures are back to back without him recovering to his baseline, or he does not start recovering within 5-10 minutes after the seizure stops. During the seizure - please turn him on his side, please make sure his head is protected (for example, you should put a  pillow under his head, if one is available), and please do not put anything in his mouth.   -------------------------------------------------------------------------------------------    It is important that your seizures are well controlled and you have none or have them rarely. In addition to avoiding injury related to breakthrough seizures, frequent seizures increase risk of SUDEP (sudden unexpected death in epilepsy), where a person goes into a seizure and then never wakes up - this is a rare complication of seizure disorder; one of the best available ways to prevent it is to control your seizures well.     Due to the high volume of patients we are trying to help, your physician will not be able to respond by phone or in [employer] to your routine concerns between appointments.  This does not reflect a lack of interest or concern for you or your diagnosis.  Please bring these questions and concerns to your appointment where your physician can answer.  Please relay more pressing concerns to our office, either via [employer], or by phone; if not able to reach us please visit nearby Urgent Care Center or Emergency Department.  If any emergent medical needs, please seek emergent medical help and/or call 911.    Please note that we are not able to fill out paperwork that might be related to your work, utility company, disability, and/or driving, among others, in between the visits.  Please schedule a dedicated appointment to address your paperwork, so we can do that in a timely manner.  This is not due to lack of concern or interest for your disease-related work/administrative problems, but to make sure that we provide the best possible care and to fill out your paperwork in a correct and timely manner.    Thank you for entrusting your neurological care to [facility] and we look forward to continuing to serve you.

## 2025-03-14 ENCOUNTER — OFFICE VISIT (OUTPATIENT)
Dept: MEDICAL GROUP | Facility: CLINIC | Age: 50
End: 2025-03-14
Payer: COMMERCIAL

## 2025-03-14 VITALS
WEIGHT: 149.9 LBS | BODY MASS INDEX: 20.98 KG/M2 | DIASTOLIC BLOOD PRESSURE: 90 MMHG | HEIGHT: 71 IN | TEMPERATURE: 98.7 F | OXYGEN SATURATION: 100 % | HEART RATE: 80 BPM | SYSTOLIC BLOOD PRESSURE: 146 MMHG

## 2025-03-14 DIAGNOSIS — I10 PRIMARY HYPERTENSION: ICD-10-CM

## 2025-03-14 PROCEDURE — 3080F DIAST BP >= 90 MM HG: CPT | Mod: GC

## 2025-03-14 PROCEDURE — 99213 OFFICE O/P EST LOW 20 MIN: CPT | Mod: GE

## 2025-03-14 PROCEDURE — 3077F SYST BP >= 140 MM HG: CPT | Mod: GC

## 2025-03-14 RX ORDER — CHLORTHALIDONE 25 MG/1
25 TABLET ORAL DAILY
Qty: 30 TABLET | Refills: 0 | Status: CANCELLED | OUTPATIENT
Start: 2025-03-14

## 2025-03-14 RX ORDER — IPRATROPIUM BROMIDE 42 UG/1
SPRAY, METERED NASAL
COMMUNITY
Start: 2025-01-18

## 2025-03-14 NOTE — ASSESSMENT & PLAN NOTE
Chronic. Elevated in clinic. Patient with report of situational hypertension.  Otherwise mostly well-controlled at home with SBP as low as 101.  Discussed options for treatment including addition of medication versus continued ambulatory blood pressure monitoring.  In shared decision making:  - Continue losartan 50 mg daily  - If BP consistently elevated over 140/90 will add chlorthalidone  - Patient to follow in 3 months or after colonoscopy, whichever is sooner   -ED and return precautions discussed

## 2025-03-14 NOTE — PATIENT INSTRUCTIONS
-If BP consistently over 140/90s then let me know and we can start chlorthalidone   -Follow after colonoscopy and neurology work-up

## 2025-04-03 ENCOUNTER — HOSPITAL ENCOUNTER (OUTPATIENT)
Dept: RADIOLOGY | Facility: MEDICAL CENTER | Age: 50
End: 2025-04-03
Payer: COMMERCIAL

## 2025-04-03 DIAGNOSIS — Z87.898 HISTORY OF SEIZURE: ICD-10-CM

## 2025-04-03 PROCEDURE — 70551 MRI BRAIN STEM W/O DYE: CPT

## 2025-05-06 ENCOUNTER — TELEMEDICINE (OUTPATIENT)
Dept: NEUROLOGY | Facility: MEDICAL CENTER | Age: 50
End: 2025-05-06
Attending: PSYCHIATRY & NEUROLOGY
Payer: COMMERCIAL

## 2025-05-06 DIAGNOSIS — Z87.898 HISTORY OF SEIZURE: ICD-10-CM

## 2025-05-06 ASSESSMENT — PATIENT HEALTH QUESTIONNAIRE - PHQ9: CLINICAL INTERPRETATION OF PHQ2 SCORE: 0

## 2025-05-06 NOTE — PROGRESS NOTES
[facility] Epilepsy Program  New Patient Visit      Patient's Name: Niranjan Richter  YOB: 1975  MRN: [id number]  Date of Service: 05/06/25    Referring Provider: Luisa Toscano M.D.  [address] [place] [address]    This Remote Face to Face encounter was conducted via Teams.  I am providing medical care to this patient via audio/video visit in place of an in person visit at the request of the patient. Verbal consent to telehealth, risks, benefits, and consequences were discussed. Patient retains the right to withdraw at any time. All existing confidentiality protections apply. The patient has access to all transmitted medical information. No dissemination of any patient images or information to other entities without further written consent.     This evaluation was conducted via Teams using secure and encrypted videoconferencing technology. The patient was in a private location in the state of [place], in his car.    The patient's identity was confirmed and verbal consent was obtained for this virtual visit. The provider, myself, was located at the [facility] Office, [place], [place], in private setting, to ensure confidentiality of the encounter.       Chief Concern: Seizures. Paperwork.    The patient presents alone today.     HPI: The patient is a 49 y.o., right-handed male, who initially presented to my epilepsy clinic for evaluation of seizure disorder on 03/10/2025. The patient used to follow up with Dr. Chavez.    He presents today for a letter in order to proceed with his GI procedure (endoscopy and colonoscopy).     He had no events suspicious for seizures since September 2024.     He is taking Depakote  mg BID regularly.     The letter for his GI physician was provided and the patient will print it at home.    Follow up as scheduled.    My total time spent caring for the patient on the day of the encounter was 12 minutes.   This does not include time spent  on separately billable procedures/tests.    Edson Yao MD  Outpatient Neurology   [facility]

## 2025-05-06 NOTE — LETTER
May 6, 2025    To Whom It May Concern:         This is confirmation that Niranjan Richter attended his scheduled appointment with Edson Yao M.D. on 5/06/25.          The patient has a seizures disorder and is currently undergoing work up for further classification of his seizure disorder. His most recent MRI brain was reported as normal.          As always, sedation/anesthesia typically decreases risk of breakthrough seizures, but emergence from sedation/anesthesia does increase the risk of breakthrough seizures. If breakthrough seizure(s) do occur, these should be treated in a customary manner. The patient should continue his Depakote with no interruptions. Please note that Depakote may increase risk of bleeding. In context of above, depending on endoscopy center resources, one may consider performing the GI procedures in the hospital setting, in order to have support in case seizures occur. The seizure disorder in itself is not a contraindication for medically indicated endoscopy/colonoscopy.          If you have any questions please do not hesitate to call me at the phone number listed below.    Sincerely,  Edson Yao M.D.  [phone]

## 2025-05-16 ENCOUNTER — APPOINTMENT (OUTPATIENT)
Dept: NEUROLOGY | Facility: MEDICAL CENTER | Age: 50
End: 2025-05-16
Attending: STUDENT IN AN ORGANIZED HEALTH CARE EDUCATION/TRAINING PROGRAM
Payer: COMMERCIAL

## 2025-06-16 ENCOUNTER — OFFICE VISIT (OUTPATIENT)
Dept: NEUROLOGY | Facility: MEDICAL CENTER | Age: 50
End: 2025-06-16
Attending: PSYCHIATRY & NEUROLOGY
Payer: COMMERCIAL

## 2025-06-16 VITALS
BODY MASS INDEX: 21.56 KG/M2 | OXYGEN SATURATION: 98 % | HEIGHT: 71 IN | DIASTOLIC BLOOD PRESSURE: 88 MMHG | RESPIRATION RATE: 16 BRPM | HEART RATE: 55 BPM | WEIGHT: 154 LBS | TEMPERATURE: 97.7 F | SYSTOLIC BLOOD PRESSURE: 152 MMHG

## 2025-06-16 DIAGNOSIS — Z87.898 HISTORY OF SEIZURE: ICD-10-CM

## 2025-06-16 PROCEDURE — 99213 OFFICE O/P EST LOW 20 MIN: CPT | Performed by: PSYCHIATRY & NEUROLOGY

## 2025-06-16 PROCEDURE — 3079F DIAST BP 80-89 MM HG: CPT | Performed by: PSYCHIATRY & NEUROLOGY

## 2025-06-16 PROCEDURE — 3077F SYST BP >= 140 MM HG: CPT | Performed by: PSYCHIATRY & NEUROLOGY

## 2025-06-16 PROCEDURE — 99214 OFFICE O/P EST MOD 30 MIN: CPT | Performed by: PSYCHIATRY & NEUROLOGY

## 2025-06-16 PROCEDURE — 99212 OFFICE O/P EST SF 10 MIN: CPT | Performed by: PSYCHIATRY & NEUROLOGY

## 2025-06-16 RX ORDER — DIVALPROEX SODIUM 250 MG/1
750 TABLET, DELAYED RELEASE ORAL 2 TIMES DAILY
Qty: 540 TABLET | Refills: 1 | Status: SHIPPED | OUTPATIENT
Start: 2025-06-16

## 2025-06-16 ASSESSMENT — PATIENT HEALTH QUESTIONNAIRE - PHQ9: CLINICAL INTERPRETATION OF PHQ2 SCORE: 0

## 2025-06-16 NOTE — PATIENT INSTRUCTIONS
Please continue Depakote 750 mg twice daily with no changes.      Please let our office know if you have any changes in your seizure frequency and/characteristics. Otherwise, please keep the diary of your events and bring it with you at the time of your next follow up visit with our office.     Please take vitamin D3 [id number] internation units daily.     Please note that the following might precipitate seizures: missed doses of antiseizure medications, being sick with fever, stress, fatigue, sleep deprivation, not eating regularly, not drinking enough water, drinking too much alcohol, stopping alcohol suddenly if you are currently using it on a regular/daily basis, and/or using recreational drugs, among others.    Please note that the following might lead to an injury, potentially a life-threatening injury, in case you have a seizure and/or lose awareness while:   - being in a large body of water by yourself, such as bath, pool, lake, ocean, among others (risk of drowning)   - being on unprotected heights (risk of fall)   - being around and/or operating heavy machinery (risk of injury)   - being around open fire/hot surfaces (risk of burns)   - any other activities/circumstances, in which if you lose awareness, you might injure yourself and/or others.    Please call for help (crisis line and/or 911) in case you have thoughts of harming yourself and/or others.    Please stop driving if you experience any changes/loss of awareness and/or seizures.     ------------------------------------------------------------------------------------------  Instructions for your family/caregivers:  Please call 911 if the patient has a seizure longer than 2-3 minutes, if seizures are back to back without him recovering to his baseline, or he does not start recovering within 5-10 minutes after the seizure stops. During the seizure - please turn him on his side, please make sure his head is protected (for example, you should put a  pillow under his head, if one is available), and please do not put anything in his mouth.   -------------------------------------------------------------------------------------------    It is important that your seizures are well controlled and you have none or have them rarely. In addition to avoiding injury related to breakthrough seizures, frequent seizures increase risk of SUDEP (sudden unexpected death in epilepsy), where a person goes into a seizure and then never wakes up - this is a rare complication of seizure disorder; one of the best available ways to prevent it is to control your seizures well.     Due to the high volume of patients we are trying to help, your physician will not be able to respond by phone or in Gabstrhart to your routine concerns between appointments.  This does not reflect a lack of interest or concern for you or your diagnosis.  Please bring these questions and concerns to your appointment where your physician can answer.  Please relay more pressing concerns to our office, either via Gabstrhart, or by phone; if not able to reach us please visit nearby Urgent Care Center or Emergency Department.  If any emergent medical needs, please seek emergent medical help and/or call 911.    Please note that we are not able to fill out paperwork that might be related to your work, utility company, disability, and/or driving, among others, in between the visits.  Please schedule a dedicated appointment to address your paperwork, so we can do that in a timely manner.  This is not due to lack of concern or interest for your disease-related work/administrative problems, but to make sure that we provide the best possible care and to fill out your paperwork in a correct and timely manner.    Thank you for entrusting your neurological care to [facility] and we look forward to continuing to serve you.
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
## 2025-06-17 ENCOUNTER — NON-PROVIDER VISIT (OUTPATIENT)
Dept: NEUROLOGY | Facility: MEDICAL CENTER | Age: 50
End: 2025-06-17
Attending: PSYCHIATRY & NEUROLOGY
Payer: COMMERCIAL

## 2025-06-17 DIAGNOSIS — Z87.898 HISTORY OF SEIZURE: Primary | ICD-10-CM

## 2025-06-17 PROCEDURE — 95719 EEG PHYS/QHP EA INCR W/O VID: CPT | Performed by: PSYCHIATRY & NEUROLOGY

## 2025-06-17 PROCEDURE — 95700 EEG CONT REC W/VID EEG TECH: CPT | Performed by: PSYCHIATRY & NEUROLOGY

## 2025-06-17 PROCEDURE — 95708 EEG WO VID EA 12-26HR UNMNTR: CPT | Performed by: PSYCHIATRY & NEUROLOGY

## 2025-06-17 NOTE — PROCEDURES
(No note.)   Intermittent Photic stimulation was performed in a stepwise fashion from 1 to 30 Hz and did not elicit any abnormalities on EEG. During photic stimulation, at the onset of several trains of stimulation, the patient would twitch - head to the side and trunk movement - no clear ictal/interictal EEG correlate - only muscle/movement artifact.     EKG: Sampling of the EKG recording did not demonstrate any abnormalities    EVENTS:  Push button events and/or ambulatory diary events: No events reported.     INTERPRETATION:  This was a normal ambulatory EEG recording in the awake and drowsy/sleep state(s):  No regional slowing or persistent focal asymmetries were seen.  No epileptiform discharges were seen.  No seizures.   Clinical Events: None reported on the event diary. During photic stimulation, at the onset of several trains of stimulation, the patient would twitch - head to the side and trunk movement - no clear ictal/interictal EEG correlate - only muscle/movement artifact.     Edson Yao MD  Neurology Attending, Epilepsy Program  [facility]

## 2025-06-18 ENCOUNTER — NON-PROVIDER VISIT (OUTPATIENT)
Dept: NEUROLOGY | Facility: MEDICAL CENTER | Age: 50
End: 2025-06-18
Attending: PSYCHIATRY & NEUROLOGY
Payer: COMMERCIAL

## 2025-06-18 DIAGNOSIS — Z87.898 HISTORY OF SEIZURE: Primary | ICD-10-CM

## 2025-06-18 PROCEDURE — 95708 EEG WO VID EA 12-26HR UNMNTR: CPT | Performed by: PSYCHIATRY & NEUROLOGY

## 2025-06-18 PROCEDURE — 95719 EEG PHYS/QHP EA INCR W/O VID: CPT | Performed by: PSYCHIATRY & NEUROLOGY

## 2025-06-18 NOTE — PROCEDURES
(No note.)   ambulatory diary events: None reported on the event diary.     INTERPRETATION:  This was a normal ambulatory EEG recording in the awake and drowsy/sleep state(s):  No regional slowing or persistent focal asymmetries were seen.  No epileptiform discharges were seen.  No seizures.   Clinical Events: None reported on the event diary. There were multiple, apparently accidental, event button push events.   During the initial portion of the night, the sleep was fragmented - consider a sleep study.     Edson Yao MD  Neurology Attending, Epilepsy Program  [facility]

## 2025-06-19 ENCOUNTER — NON-PROVIDER VISIT (OUTPATIENT)
Dept: NEUROLOGY | Facility: MEDICAL CENTER | Age: 50
End: 2025-06-19
Attending: PSYCHIATRY & NEUROLOGY
Payer: COMMERCIAL

## 2025-06-19 DIAGNOSIS — Z12.11 COLON CANCER SCREENING: Primary | ICD-10-CM

## 2025-06-19 DIAGNOSIS — Z87.898 HISTORY OF SEIZURE: Primary | ICD-10-CM

## 2025-06-19 PROCEDURE — 95719 EEG PHYS/QHP EA INCR W/O VID: CPT | Performed by: PSYCHIATRY & NEUROLOGY

## 2025-06-19 PROCEDURE — 95708 EEG WO VID EA 12-26HR UNMNTR: CPT | Performed by: PSYCHIATRY & NEUROLOGY

## 2025-06-19 NOTE — PROCEDURES
(No note.)   one, accidental, event button push event.     INTERPRETATION:  This was a normal ambulatory EEG recording in the awake and drowsy/sleep state(s):  No regional slowing or persistent focal asymmetries were seen.  No epileptiform discharges were seen.  No seizures.   Clinical Events: None reported on the event diary. There was one, accidental, event button push event.   The sleep was fragmented - consider a sleep study.     Edson Yao MD  Neurology Attending, Epilepsy Program  [facility]

## 2025-06-20 ENCOUNTER — NON-PROVIDER VISIT (OUTPATIENT)
Dept: NEUROLOGY | Facility: MEDICAL CENTER | Age: 50
End: 2025-06-20
Attending: PSYCHIATRY & NEUROLOGY
Payer: COMMERCIAL

## 2025-06-24 ENCOUNTER — HOSPITAL ENCOUNTER (OUTPATIENT)
Dept: LAB | Facility: MEDICAL CENTER | Age: 50
End: 2025-06-24
Attending: PSYCHIATRY & NEUROLOGY
Payer: COMMERCIAL

## 2025-06-24 DIAGNOSIS — Z87.898 HISTORY OF SEIZURE: ICD-10-CM

## 2025-06-24 LAB
ALBUMIN SERPL BCP-MCNC: 4.2 G/DL (ref 3.2–4.9)
ALBUMIN/GLOB SERPL: 1.7 G/DL
ALP SERPL-CCNC: 69 U/L (ref 30–99)
ALT SERPL-CCNC: 13 U/L (ref 2–50)
AMMONIA PLAS-SCNC: 34 UMOL/L (ref 11–45)
ANION GAP SERPL CALC-SCNC: 12 MMOL/L (ref 7–16)
AST SERPL-CCNC: 22 U/L (ref 12–45)
BASOPHILS # BLD AUTO: 1.3 % (ref 0–1.8)
BASOPHILS # BLD: 0.04 K/UL (ref 0–0.12)
BILIRUB SERPL-MCNC: 0.5 MG/DL (ref 0.1–1.5)
BUN SERPL-MCNC: 11 MG/DL (ref 8–22)
CALCIUM ALBUM COR SERPL-MCNC: 8.8 MG/DL (ref 8.5–10.5)
CALCIUM SERPL-MCNC: 9 MG/DL (ref 8.5–10.5)
CHLORIDE SERPL-SCNC: 101 MMOL/L (ref 96–112)
CO2 SERPL-SCNC: 24 MMOL/L (ref 20–33)
CREAT SERPL-MCNC: 0.83 MG/DL (ref 0.5–1.4)
EOSINOPHIL # BLD AUTO: 0.1 K/UL (ref 0–0.51)
EOSINOPHIL NFR BLD: 3.2 % (ref 0–6.9)
ERYTHROCYTE [DISTWIDTH] IN BLOOD BY AUTOMATED COUNT: 45.8 FL (ref 35.9–50)
GFR SERPLBLD CREATININE-BSD FMLA CKD-EPI: 107 ML/MIN/1.73 M 2
GLOBULIN SER CALC-MCNC: 2.5 G/DL (ref 1.9–3.5)
GLUCOSE SERPL-MCNC: 94 MG/DL (ref 65–99)
HCT VFR BLD AUTO: 40.4 % (ref 42–52)
HGB BLD-MCNC: 14 G/DL (ref 14–18)
IMM GRANULOCYTES # BLD AUTO: 0.01 K/UL (ref 0–0.11)
IMM GRANULOCYTES NFR BLD AUTO: 0.3 % (ref 0–0.9)
LYMPHOCYTES # BLD AUTO: 1.29 K/UL (ref 1–4.8)
LYMPHOCYTES NFR BLD: 41.9 % (ref 22–41)
MCH RBC QN AUTO: 34.9 PG (ref 27–33)
MCHC RBC AUTO-ENTMCNC: 34.7 G/DL (ref 32.3–36.5)
MCV RBC AUTO: 100.7 FL (ref 81.4–97.8)
MONOCYTES # BLD AUTO: 0.32 K/UL (ref 0–0.85)
MONOCYTES NFR BLD AUTO: 10.4 % (ref 0–13.4)
NEUTROPHILS # BLD AUTO: 1.32 K/UL (ref 1.82–7.42)
NEUTROPHILS NFR BLD: 42.9 % (ref 44–72)
NRBC # BLD AUTO: 0 K/UL
NRBC BLD-RTO: 0 /100 WBC (ref 0–0.2)
PLATELET # BLD AUTO: 251 K/UL (ref 164–446)
PMV BLD AUTO: 9.7 FL (ref 9–12.9)
POTASSIUM SERPL-SCNC: 4.5 MMOL/L (ref 3.6–5.5)
PROT SERPL-MCNC: 6.7 G/DL (ref 6–8.2)
RBC # BLD AUTO: 4.01 M/UL (ref 4.7–6.1)
SODIUM SERPL-SCNC: 137 MMOL/L (ref 135–145)
VALPROATE SERPL-MCNC: 39.2 UG/ML (ref 50–100)
WBC # BLD AUTO: 3.1 K/UL (ref 4.8–10.8)

## 2025-06-24 PROCEDURE — 85025 COMPLETE CBC W/AUTO DIFF WBC: CPT

## 2025-06-24 PROCEDURE — 36415 COLL VENOUS BLD VENIPUNCTURE: CPT

## 2025-06-24 PROCEDURE — 82140 ASSAY OF AMMONIA: CPT

## 2025-06-24 PROCEDURE — 80053 COMPREHEN METABOLIC PANEL: CPT

## 2025-06-24 PROCEDURE — 80164 ASSAY DIPROPYLACETIC ACD TOT: CPT

## 2025-06-25 NOTE — Clinical Note
Member Name: Niranjan Richter   Member Number: [id number]   Reference Number: [id number]   Approved Services: Consultation   Approved Service Dates: 06/19/2025 - 06/19/2026   Requesting Provider: Luisa Toscano   Requested Provider: [facility]     Dear Niranjan Richter:     The following medical service(s) requested by Luisa Toscano have been approved:    Procedure Code Procedure Code Name Requested Quantity Approved Quantity Status   93923 (CPT®) DE OFFICE/OUTPATIENT NEW MODERATE MDM 45 MINUTES 1 1 Authorized   38250 (CPT®) DE OFFICE/OUTPATIENT ESTABLISHED MOD MDM 30 MIN 5 5 Authorized       Approved Quantity means the number of visits approved for medication treatments and/or medical services.    The services should be provided by [facility] no later than 06/19/2026. Please contact the provider listed below with any questions.     Provider Information:  [facility]  [phone]    Your plan benefit may require a deductible, co-payment or coinsurance for these services. This authorization does not guarantee [facility] will pay the claim for services that you receive. Payment by [facility] for these services is subject to the terms of your Evidence of Coverage or Summary Plan Description, your eligibility at the time of service, and confirmation of benefit coverage.    For any questions or additional information, please contact Customer Service:    [facility]  Customer Service: [phone] or toll free [phone]  TTY users dial: [phone]   Call Center Hours: Mon - Fri 7 AM to 8 PM PST   Office Hours: Mon - Fri 8 AM to 5 PM [facility]   E-mail: [email or web address]   Website: [email or web address]       This information is available for free in other languages. Please contact Customer Service at the phone number above for more information. [facility] complies with applicable Federal civil rights laws and does not  discriminate on the basis of race, color, national origin, age, disability or sex.      Sincerely,     Healthcare Utilization Management Department     Cc: [facility]   Luisa Toscano

## 2025-06-25 NOTE — Clinical Note
REFERRAL APPROVAL NOTICE         Sent on June 25, 2025                   Niranjan Richter  [address] [place] [address]                   Dear Mr. Richter,    After a careful review of the medical information and benefit coverage, [facility] has processed your referral. See below for additional details.    If applicable, you must be actively enrolled with your insurance for coverage of the authorized service. If you have any questions regarding your coverage, please contact your insurance directly.    REFERRAL INFORMATION   Referral #:  [id number]  Referred-To Provider    Referred-By Provider:  Gastroenterology    Luisa Toscano M.D.   [facility]      [address] [place] [address]  [phone] [address] [place] [id number] [phone]    Referral Start Date:  06/19/2025  Referral End Date:   06/19/2026             SCHEDULING  If you do not already have an appointment, please call [phone] to make an appointment.     MORE INFORMATION  If you do not already have a [employer] account, sign up at: [email or web address]  You can access your medical information, make appointments, see lab results, billing information, and more.  If you have questions regarding this referral, please contact  the [facility] Referrals department at:             [phone]. Monday - Friday 8:00AM - 5:00PM.     Sincerely,    [facility]

## 2025-10-20 ENCOUNTER — APPOINTMENT (OUTPATIENT)
Facility: MEDICAL CENTER | Age: 50
End: 2025-10-20
Attending: PSYCHIATRY & NEUROLOGY
Payer: COMMERCIAL

## (undated) DEVICE — SPONGE RADIOPAQUE CTN X-LG - STERILE (50PK/CA) MADE TO ORDER ITEM AND HAS A 4-6 WEEK LEAD TIME

## (undated) DEVICE — DRAPE LARGE 3 QUARTER - (20/CA)

## (undated) DEVICE — GLOVE BIOGEL PI INDICATOR SZ 6.5 SURGICAL PF LF - (50/BX 4BX/CA)

## (undated) DEVICE — SODIUM CHL IRRIGATION 0.9% 1000ML (12EA/CA)

## (undated) DEVICE — PACK NEURO - (2EA/CA)

## (undated) DEVICE — NEPTUNE 4 PORT MANIFOLD - (20/PK)

## (undated) DEVICE — SUTURE 3-0 VICRYL PLUS SH - 8X 18 INCH (12/BX)

## (undated) DEVICE — GLOVE BIOGEL INDICATOR SZ 8 SURGICAL PF LTX - (50/BX 4BX/CA)

## (undated) DEVICE — KIT ANESTHESIA W/CIRCUIT & 3/LT BAG W/FILTER (20EA/CA)

## (undated) DEVICE — SPONGE GAUZESTER 4 X 4 4PLY - (128PK/CA)

## (undated) DEVICE — KIT ROOM DECONTAMINATION

## (undated) DEVICE — CANISTER SUCTION 3000ML MECHANICAL FILTER AUTO SHUTOFF MEDI-VAC NONSTERILE LF DISP  (40EA/CA)

## (undated) DEVICE — SPONGE PEANUT - (5/PK 50PK/CA)

## (undated) DEVICE — SHEARS ELECTROSURGICAL W/ HANDCONTROL BUTTON STERILE CURVE L23 CM OD5 MM 3 (6EA/BX)

## (undated) DEVICE — ELECTRODE DUAL RETURN W/ CORD - (50/PK)

## (undated) DEVICE — TOWELS CLOTH SURGICAL - (4/PK 20PK/CA)

## (undated) DEVICE — GLOVE BIOGEL INDICATOR SZ 7.5 SURGICAL PF LTX - (50PR/BX 4BX/CA)

## (undated) DEVICE — DRAPESURG STERI-DRAPE LONG - (10/BX 4BX/CA)

## (undated) DEVICE — ELECTRODE 850 FOAM ADHESIVE - HYDROGEL RADIOTRNSPRNT (50/PK)

## (undated) DEVICE — HEAD HOLDER JUNIOR/ADULT

## (undated) DEVICE — SET LEADWIRE 5 LEAD BEDSIDE DISPOSABLE ECG (1SET OF 5/EA)

## (undated) DEVICE — TRAY CATHETER FOLEY URINE METER W/STATLOCK 350ML (10EA/CA)

## (undated) DEVICE — CLIP MED LG INTNL HRZN TI ESCP - (20/BX)

## (undated) DEVICE — TUBE E-T HI-LO CUFF 7.5MM (10EA/PK)

## (undated) DEVICE — LACTATED RINGERS INJ 1000 ML - (14EA/CA 60CA/PF)

## (undated) DEVICE — SUTURE GENERAL

## (undated) DEVICE — BALL COTTON NEURO 1 INCH - (5/PK50PK/CA)

## (undated) DEVICE — SUTURE 4-0 30CM STRATAFIX SPIRAL PS-2 (12EA/BX)

## (undated) DEVICE — SUTURE 1 VICRYL PLUS CTX - 36 INCH (36/BX)

## (undated) DEVICE — SLEEVE, VASO, THIGH, MED

## (undated) DEVICE — STOCKINET BIAS 4 IN STERILE - (20/CA)

## (undated) DEVICE — GLOVE BIOGEL SZ 7.5 SURGICAL PF LTX - (50PR/BX 4BX/CA)

## (undated) DEVICE — CELLSAVER PACK

## (undated) DEVICE — CHLORAPREP 26 ML APPLICATOR - ORANGE TINT(25/CA)

## (undated) DEVICE — PAD BABY LAP 4X18 W/O - RINGS PREWASHED 5/PK 40PK/CS

## (undated) DEVICE — MASK ANESTHESIA ADULT  - (100/CA)

## (undated) DEVICE — PROTECTOR ULNA NERVE - (36PR/CA)

## (undated) DEVICE — GLOVE BIOGEL PI INDICATOR SZ 7.0 SURGICAL PF LF - (50/BX 4BX/CA)

## (undated) DEVICE — GLOVE SIZE 7.0 SURGEON ACCELERATOR FREE GREEN (50PR/BX, 4BX/CA)

## (undated) DEVICE — DRAPE MAGNETIC (INSTRA-MAG) - (30/CA)

## (undated) DEVICE — SENSOR SPO2 NEO LNCS ADHESIVE (20/BX) SEE USER NOTES

## (undated) DEVICE — SUTURE 3-0 SILK BB 30 (36PK/BX)"

## (undated) DEVICE — SUTURE 4-0 MONOCRYL PLUSPC-3 - 18 INCH (12/BX)

## (undated) DEVICE — SPIDER SHOULDER HOLDER (12EA/BX)

## (undated) DEVICE — GOWN WARMING STANDARD FLEX - (30/CA)

## (undated) DEVICE — GLOVE BIOGEL SZ 6 PF LATEX - (50EA/BX 4BX/CA)

## (undated) DEVICE — BLADE SURGICAL CLIPPER - (50EA/CA)

## (undated) DEVICE — PLUMEPEN ULTRA 3/8 IN X 10 FT HOSE (20EA/CA)

## (undated) DEVICE — CLIP SM INTNL HRZN TI ESCP LGT - (24EA/PK 25PK/BX)

## (undated) DEVICE — SUCTION INSTRUMENT YANKAUER BULBOUS TIP W/O VENT (50EA/CA)

## (undated) DEVICE — CELLSAVER STAT

## (undated) DEVICE — GLOVE BIOGEL INDICATOR SZ 6.5 SURGICAL PF LTX - (50PR/BX 4BX/CA)

## (undated) DEVICE — GLOVE BIOGEL SZ 8 SURGICAL PF LTX - (50PR/BX 4BX/CA)

## (undated) DEVICE — CLIP LG INTNL HRZN TI ESCP LGT - (20/BX)

## (undated) DEVICE — LACTATED RINGERS INJ. 500 ML - (24EA/CA)

## (undated) DEVICE — DRAPE LAPAROTOMY T SHEET - (12EA/CA)

## (undated) DEVICE — CLIP MED INTNL HRZN TI ESCP - (25/BX)

## (undated) DEVICE — GLOVE BIOGEL SZ 6.5 SURGICAL PF LTX (50PR/BX 4BX/CA)

## (undated) DEVICE — TUBE E-T HI-LO CUFF 7.0MM (10EA/PK)

## (undated) DEVICE — STAPLER 35MM SKIN WIDE (6EA/BX)

## (undated) DEVICE — DRAPE SURGICAL U 77X120 - (10/CA)

## (undated) DEVICE — BLANKET WARMING LOWER BODY - (10/CA) INACTIVE USE #8585

## (undated) DEVICE — SUCTION TIP STRAIGHT ARGYLE - 50EA/CA

## (undated) DEVICE — KIT SURGIFLO W/OUT THROMBIN - (6EA/CA)

## (undated) DEVICE — DRAPE SRG LG BCK TBL DISP - 10/CA

## (undated) DEVICE — BOVIE  BLADE 6 EXTENDED - (50/PK)

## (undated) DEVICE — GOWN SURGEONS LARGE - (32/CA)

## (undated) DEVICE — SET EXTENSION WITH 2 PORTS (48EA/CA) ***PART #2C8610 IS A SUBSTITUTE*****

## (undated) DEVICE — TUBING CLEARLINK DUO-VENT - C-FLO (48EA/CA)

## (undated) DEVICE — COVER MAYO STAND X-LG - (22EA/CA)

## (undated) DEVICE — TUBE CONNECT SUCTION CLEAR 120 X 1/4" (50EA/CA)"

## (undated) DEVICE — SPONGE KITTNER DISSECTORS - (5EA/PK 50PK/CA)

## (undated) DEVICE — PAD LAP STERILE 18 X 18 - (5/PK 40PK/CA)

## (undated) DEVICE — GLOVE BIOGEL PI ORTHO SZ 6 SURGICAL PF LF (40PR/BX)

## (undated) DEVICE — BLADE SURGICAL #15 - (50/BX 3BX/CA)

## (undated) DEVICE — PACK MAJOR BASIN - (2EA/CA)